# Patient Record
Sex: FEMALE | Race: BLACK OR AFRICAN AMERICAN | Employment: OTHER | URBAN - METROPOLITAN AREA
[De-identification: names, ages, dates, MRNs, and addresses within clinical notes are randomized per-mention and may not be internally consistent; named-entity substitution may affect disease eponyms.]

---

## 2017-01-03 ENCOUNTER — HOSPITAL ENCOUNTER (OUTPATIENT)
Dept: GENERAL RADIOLOGY | Age: 82
Discharge: HOME | End: 2017-01-03
Attending: PHYSICIAN ASSISTANT | Admitting: PHYSICIAN ASSISTANT

## 2017-01-03 DIAGNOSIS — K59.00 UNSPECIFIED CONSTIPATION: ICD-10-CM

## 2017-05-30 ENCOUNTER — HOSPITAL ENCOUNTER (OUTPATIENT)
Dept: CT IMAGING | Age: 82
Discharge: HOME | End: 2017-05-30
Attending: PHYSICIAN ASSISTANT | Admitting: PHYSICIAN ASSISTANT

## 2017-05-30 DIAGNOSIS — E16.2 HYPOGLYCEMIA, UNSPECIFIED: ICD-10-CM

## 2017-06-06 ENCOUNTER — HOSPITAL ENCOUNTER (OUTPATIENT)
Dept: LAB | Age: 82
Discharge: HOME | End: 2017-06-06
Attending: INTERNAL MEDICINE | Admitting: INTERNAL MEDICINE

## 2017-06-06 LAB
ALBUMIN SERPL-MCNC: 3.9 GM/DL (ref 3.4–5)
ALP BLD-CCNC: 71 IU/L (ref 40–128)
ALT SERPL-CCNC: 8 U/L (ref 10–40)
ANION GAP SERPL CALCULATED.3IONS-SCNC: 11 MMOL/L (ref 4–16)
AST SERPL-CCNC: 15 IU/L (ref 15–37)
BILIRUB SERPL-MCNC: 0.2 MG/DL (ref 0–1)
BUN BLDV-MCNC: 31 MG/DL (ref 6–23)
CALCIUM SERPL-MCNC: 10.3 MG/DL (ref 8.3–10.6)
CHLORIDE BLD-SCNC: 104 MMOL/L (ref 99–110)
CO2: 28 MMOL/L (ref 21–32)
CORTISOL, PLASMA: 18.41
CREAT SERPL-MCNC: 1.3 MG/DL (ref 0.6–1.1)
GFR AFRICAN AMERICAN: 47 ML/MIN/1.73M2
GFR NON-AFRICAN AMERICAN: 39 ML/MIN/1.73M2
GLUCOSE FASTING: 46 MG/DL (ref 70–99)
POTASSIUM SERPL-SCNC: 4.1 MMOL/L (ref 3.5–5.1)
SODIUM BLD-SCNC: 143 MMOL/L (ref 135–145)
TOTAL PROTEIN: 7.5 GM/DL (ref 6.4–8.2)

## 2017-06-07 LAB
INSULIN LIKE GROWTH FACTOR: 177
INSULIN: 13

## 2017-06-08 LAB
C-PEPTIDE: 5.2
GROWTH HORMONE: 5.19

## 2017-07-26 PROBLEM — I71.40 ABDOMINAL AORTIC ANEURYSM (AAA) WITHOUT RUPTURE: Status: ACTIVE | Noted: 2017-07-26

## 2017-07-26 PROBLEM — R91.1 LUNG NODULE: Status: ACTIVE | Noted: 2017-07-26

## 2017-07-26 PROBLEM — I71.20 THORACIC AORTIC ANEURYSM WITHOUT RUPTURE: Status: ACTIVE | Noted: 2017-07-26

## 2017-08-04 ENCOUNTER — HOSPITAL ENCOUNTER (OUTPATIENT)
Dept: GENERAL RADIOLOGY | Age: 82
Discharge: HOME | End: 2017-08-04
Attending: SURGERY | Admitting: SURGERY

## 2018-09-14 PROBLEM — G93.40 ACUTE ENCEPHALOPATHY: Status: ACTIVE | Noted: 2018-09-14

## 2018-09-15 PROBLEM — I63.10 CEREBROVASCULAR ACCIDENT (CVA) DUE TO EMBOLISM OF PRECEREBRAL ARTERY (HCC): Status: ACTIVE | Noted: 2018-09-15

## 2018-09-15 PROBLEM — R77.8 ELEVATED TROPONIN: Status: ACTIVE | Noted: 2018-09-15

## 2018-09-20 PROBLEM — G93.40 ACUTE ENCEPHALOPATHY: Status: RESOLVED | Noted: 2018-09-14 | Resolved: 2018-09-20

## 2018-09-20 PROBLEM — R77.8 ELEVATED TROPONIN: Status: RESOLVED | Noted: 2018-09-15 | Resolved: 2018-09-20

## 2018-10-12 ENCOUNTER — OFFICE VISIT (OUTPATIENT)
Dept: CARDIOLOGY CLINIC | Age: 83
End: 2018-10-12
Payer: MEDICARE

## 2018-10-12 VITALS
HEART RATE: 68 BPM | SYSTOLIC BLOOD PRESSURE: 132 MMHG | HEIGHT: 61 IN | BODY MASS INDEX: 24.81 KG/M2 | DIASTOLIC BLOOD PRESSURE: 80 MMHG | WEIGHT: 131.4 LBS

## 2018-10-12 DIAGNOSIS — I71.20 THORACIC AORTIC ANEURYSM WITHOUT RUPTURE: ICD-10-CM

## 2018-10-12 DIAGNOSIS — R41.89 COGNITIVE IMPAIRMENT: ICD-10-CM

## 2018-10-12 DIAGNOSIS — I25.10 ASCVD (ARTERIOSCLEROTIC CARDIOVASCULAR DISEASE): ICD-10-CM

## 2018-10-12 DIAGNOSIS — Q21.12 PFO (PATENT FORAMEN OVALE): ICD-10-CM

## 2018-10-12 DIAGNOSIS — I71.40 ABDOMINAL AORTIC ANEURYSM (AAA) WITHOUT RUPTURE: ICD-10-CM

## 2018-10-12 DIAGNOSIS — I63.10 CEREBROVASCULAR ACCIDENT (CVA) DUE TO EMBOLISM OF PRECEREBRAL ARTERY (HCC): Primary | ICD-10-CM

## 2018-10-12 PROCEDURE — 99214 OFFICE O/P EST MOD 30 MIN: CPT | Performed by: INTERNAL MEDICINE

## 2018-10-12 PROCEDURE — G8598 ASA/ANTIPLAT THER USED: HCPCS | Performed by: INTERNAL MEDICINE

## 2018-10-12 PROCEDURE — G8420 CALC BMI NORM PARAMETERS: HCPCS | Performed by: INTERNAL MEDICINE

## 2018-10-12 PROCEDURE — G8482 FLU IMMUNIZE ORDER/ADMIN: HCPCS | Performed by: INTERNAL MEDICINE

## 2018-10-12 PROCEDURE — 1101F PT FALLS ASSESS-DOCD LE1/YR: CPT | Performed by: INTERNAL MEDICINE

## 2018-10-12 PROCEDURE — 1111F DSCHRG MED/CURRENT MED MERGE: CPT | Performed by: INTERNAL MEDICINE

## 2018-10-12 PROCEDURE — G8427 DOCREV CUR MEDS BY ELIG CLIN: HCPCS | Performed by: INTERNAL MEDICINE

## 2018-10-12 PROCEDURE — 1090F PRES/ABSN URINE INCON ASSESS: CPT | Performed by: INTERNAL MEDICINE

## 2018-10-12 PROCEDURE — 1123F ACP DISCUSS/DSCN MKR DOCD: CPT | Performed by: INTERNAL MEDICINE

## 2018-10-12 PROCEDURE — 1036F TOBACCO NON-USER: CPT | Performed by: INTERNAL MEDICINE

## 2018-10-12 PROCEDURE — 4040F PNEUMOC VAC/ADMIN/RCVD: CPT | Performed by: INTERNAL MEDICINE

## 2018-10-12 NOTE — ASSESSMENT & PLAN NOTE
She is multivessel nonobstructive coronary artery disease. Continue aspirin and statin. She has no angina.   There is no indication of beta blockers

## 2018-10-12 NOTE — PROGRESS NOTES
9/19/18   Summary   Right to left PFO. positive bubble study   Lambl's excrescence seen on aortic valve cusps.   Thickened aortic valve leaflets.   Mild to moderate mitral regurgitation is present.   Small pericardial effusion with fibrinous matter; likely chronic.   Mildly dilated ascending aorta.   Ejection fraction is visually estimated at 55%. Cath 9/15/18     Conclusions      Procedure Summary   Indication: elevated troponin   Access : attempted radial but switched to femoral due to   tortuous subclavian   1. Left main is patent   2. OStial LAD has mild to moderate 30 to 40 % stenosis , in [place]   caudal view the stenosis appears worse but in other views it is   patent   3. RCA and CIRC has mild disease   4. Distal RCA has 50 % stenosis     Angiographic Findings    Diagnostic Findings     Cardiac Arteries and Lesion Findings   LMCA: Normal (no stenosis %) and No Angiographicalyl Significant  Disease. patent, very large calibre vessel    LAD: MILLER / CRANI view suggest proximal LAD lesion but it was probably  streaming effect as in other views it is widely patent, there is a heavily  calcified proximal LAD lesion    LCx: No Angiographicalyl Significant Disease and Mild Lumen  Irregularity. Widely patent large OM    RCA: distal RCA has a 40 to 50 % lesion       Carotid 9/14/18  The right internal carotid artery demonstrates 50-69% stenosis.       The left internal carotid artery demonstrates 50-69% stenosis.       Bilateral vertebral arteries are patent with flow in the normal direction. All labs, medications and tests reviewed by myself including data and history from outside source , patient and available family . Assessment & Plan:      1. Cerebrovascular accident (CVA) due to embolism of precerebral artery ([address]    2. Abdominal aortic aneurysm (AAA) without rupture ([address]    3. Thoracic aortic aneurysm without rupture ([address]    4. ASCVD (arteriosclerotic cardiovascular disease)    5.  PFO (patent foramen

## 2019-05-21 ENCOUNTER — OFFICE VISIT (OUTPATIENT)
Dept: CARDIOLOGY CLINIC | Age: 84
End: 2019-05-21
Payer: MEDICARE

## 2019-05-21 VITALS
WEIGHT: 118.8 LBS | BODY MASS INDEX: 22.43 KG/M2 | HEIGHT: 61 IN | DIASTOLIC BLOOD PRESSURE: 70 MMHG | SYSTOLIC BLOOD PRESSURE: 110 MMHG | HEART RATE: 58 BPM

## 2019-05-21 DIAGNOSIS — R63.4 WEIGHT LOSS: ICD-10-CM

## 2019-05-21 DIAGNOSIS — I63.10 CEREBROVASCULAR ACCIDENT (CVA) DUE TO EMBOLISM OF PRECEREBRAL ARTERY (HCC): ICD-10-CM

## 2019-05-21 DIAGNOSIS — I71.20 THORACIC AORTIC ANEURYSM WITHOUT RUPTURE: ICD-10-CM

## 2019-05-21 DIAGNOSIS — I25.10 ASCVD (ARTERIOSCLEROTIC CARDIOVASCULAR DISEASE): Primary | ICD-10-CM

## 2019-05-21 DIAGNOSIS — Q21.12 PFO (PATENT FORAMEN OVALE): ICD-10-CM

## 2019-05-21 DIAGNOSIS — R41.89 COGNITIVE IMPAIRMENT: ICD-10-CM

## 2019-05-21 DIAGNOSIS — I71.40 ABDOMINAL AORTIC ANEURYSM (AAA) WITHOUT RUPTURE: ICD-10-CM

## 2019-05-21 PROCEDURE — 1123F ACP DISCUSS/DSCN MKR DOCD: CPT | Performed by: INTERNAL MEDICINE

## 2019-05-21 PROCEDURE — 1036F TOBACCO NON-USER: CPT | Performed by: INTERNAL MEDICINE

## 2019-05-21 PROCEDURE — G8598 ASA/ANTIPLAT THER USED: HCPCS | Performed by: INTERNAL MEDICINE

## 2019-05-21 PROCEDURE — G8427 DOCREV CUR MEDS BY ELIG CLIN: HCPCS | Performed by: INTERNAL MEDICINE

## 2019-05-21 PROCEDURE — 4040F PNEUMOC VAC/ADMIN/RCVD: CPT | Performed by: INTERNAL MEDICINE

## 2019-05-21 PROCEDURE — G8420 CALC BMI NORM PARAMETERS: HCPCS | Performed by: INTERNAL MEDICINE

## 2019-05-21 PROCEDURE — 99213 OFFICE O/P EST LOW 20 MIN: CPT | Performed by: INTERNAL MEDICINE

## 2019-05-21 PROCEDURE — 1090F PRES/ABSN URINE INCON ASSESS: CPT | Performed by: INTERNAL MEDICINE

## 2019-05-21 NOTE — PROGRESS NOTES
CARDIOLOGY  NOTE    Chief Complaint: Follow-up for CVA and elevated troponin levels. Coronary artery disease    HPI:   Sandra Pedersen is a 80y.o. year old who has history as noted below. She has lost about 10 lbs since her last visit . She has no symptoms . She says she I not hungry   She was admitted in the hospital in September 2018 after she presented with altered mental status. She was noted to have elevated troponin levels of 0.5. Cardiac cath revealed moderate disease, but no significant stenosis. No intervention was done. Her MRIs revealed bilateral cerebral strokes. She had a ELZA which confirmed a PFO. Today she tells me that she continues to have memory deficits and difficulty managing simple chores, recalling events  She is having memmory issues      Current Outpatient Medications   Medication Sig Dispense Refill    apixaban (ELIQUIS) 2.5 MG TABS tablet Take 1 tablet by mouth 2 times daily 60 tablet 3    atorvastatin (LIPITOR) 20 MG tablet Take 1 tablet by mouth nightly 30 tablet 3    carvedilol (COREG) 6.25 MG tablet Take 1 tablet by mouth 2 times daily (with meals) 60 tablet 3    folic acid-pyridoxine-cyancobalamin (FOLTX) 1.13-25-2 MG TABS Take 1 tablet by mouth daily 30 tablet 3     No current facility-administered medications for this visit. Allergies:   Patient has no known allergies.     Patient History:  Past Medical History:   Diagnosis Date    Arthritis     Hyperlipidemia     Hypertension      Past Surgical History:   Procedure Laterality Date    HYSTERECTOMY       Family History   Problem Relation Age of Onset    Diabetes Mother     Heart Disease Mother     Coronary Art Dis Sister     Breast Cancer Sister     Heart Attack Brother     Heart Disease Brother     Heart Attack Maternal Grandfather     Heart Attack Brother      Social History     Tobacco Use    Smoking status: Former Smoker    Smokeless tobacco: Never Used   Substance Use Topics    Alcohol use: No        Review of Systems:   · Constitutional: No Fever or Weight Loss   · Eyes: No Decreased Vision  · ENT: No Headaches, Hearing Loss or Vertigo  · Cardiovascular: as per note above   · Respiratory: No cough or wheezing and as per note above. · Gastrointestinal: No abdominal pain, appetite loss, blood in stools, constipation, diarrhea or heartburn  · Genitourinary: No dysuria, trouble voiding, or hematuria  · Musculoskeletal:  None  · Integumentary: No rash or pruritis  · Neurological: cognitive Impairment  · Psychiatric: No anxiety or depression  · Endocrine: No malaise, fatigue or temperature intolerance  · Hematologic/Lymphatic: No bleeding problems, blood clots or swollen lymph nodes  · Allergic/Immunologic: No nasal congestion or hives    Objective:      Physical Exam:  /70   Pulse 58   Ht 5' 1\" (1.549 m)   Wt 118 lb 12.8 oz (53.9 kg)   BMI 22.45 kg/m²   Wt Readings from Last 3 Encounters:   05/21/19 118 lb 12.8 oz (53.9 kg)   10/12/18 131 lb 6.4 oz (59.6 kg)   09/20/18 136 lb (61.7 kg)     Body mass index is 22.45 kg/m². Vitals:    05/21/19 1451   BP: 110/70   Pulse: 58        General Appearance:  No distress, conversant  Constitutional:  Well developed, Well nourished, No acute distress, Non-toxic appearance. HENT:  Normocephalic, Atraumatic, Bilateral external ears normal, Oropharynx moist, No oral exudates, Nose normal. Neck- Normal range of motion, No tenderness, Supple, No stridor,no apical-carotid delay  Eyes:  PERRL, EOMI, Conjunctiva normal, No discharge. Respiratory:  Normal breath sounds, No respiratory distress, No wheezing, No chest tenderness. ,no use of accessory muscles, NO crackles  Cardiovascular: (PMI) apex non displaced,no lifts no thrills,S1 and S2 audible, No added heart sounds, No signs of ankle edema, or volume overload, No evidence of JVD, No crackles  GI:  Bowel sounds normal, Soft, No tenderness, No masses, No gross visceromegaly   :  No costovertebral angle tenderness   Musculoskeletal:  No edema, no tenderness, no deformities.  Back- no tenderness  Integument:  Well hydrated, no rash   Lymphatic:  No lymphadenopathy noted   Neurologic:  Alert & oriented x 3, CN 2-12 normal, normal motor function, normal sensory function, no focal deficits noted   Psychiatric:  Speech and behavior appropriate       Medical decision making and Data review:  DATA:  Lab Results   Component Value Date    TROPONINT 0.504 (HH) 09/15/2018     BNP:    Lab Results   Component Value Date    PROBNP 386.2 (H) 09/14/2018     PT/INR:  No results found for: PTINR  No results found for: LABA1C  Lab Results   Component Value Date    CHOL 180 09/15/2018    TRIG 91 09/15/2018    HDL 57 09/15/2018    LDLDIRECT 118 (H) 09/15/2018     Lab Results   Component Value Date    ALT 22 09/15/2018    AST 22 09/15/2018     TSH: No results found for: TSH  Lab Results   Component Value Date    AST 22 09/15/2018    ALT 22 09/15/2018    BILIDIR 0.2 06/23/2017    BILITOT 0.5 09/15/2018    ALKPHOS 61 09/15/2018     Lab Results   Component Value Date    PROBNP 386.2 (H) 09/14/2018     No results found for: LABA1C  Lab Results   Component Value Date    WBC 6.8 09/16/2018    HGB 9.0 (L) 09/16/2018    HCT 28.1 (L) 09/16/2018     09/16/2018     Echo 9/17/18    Summary   Left ventricular function is normal , EF is estimated at 55%.   Mild concentric left ventricular hypertrophy.   Grade I diastolic dysfunction suspected.   Right ventricular systolic pressure of 60QXVY, which is consistent with a   normal right ventricular systolic pressure   Trivial aortic regurgitation is noted.   Mild mitral regurgitation is present.   Right ventricular systolic pressure of 99NNZY, which is consistent with a   normal right ventricular systolic pressure.   Small pericardial effusion without tamponade physiology.   Borderline dilation of the ascending aorta.   Dilation of the abdominal aorta is noted.     ELZA 9/19/18   Summary   Right to left PFO. positive bubble study   Lambl's excrescence seen on aortic valve cusps.   Thickened aortic valve leaflets.   Mild to moderate mitral regurgitation is present.   Small pericardial effusion with fibrinous matter; likely chronic.   Mildly dilated ascending aorta.   Ejection fraction is visually estimated at 55%. Cath 9/15/18     Conclusions      Procedure Summary   Indication: elevated troponin   Access : attempted radial but switched to femoral due to   tortuous subclavian   1. Left main is patent   2. OStial LAD has mild to moderate 30 to 40 % stenosis , in [place]   caudal view the stenosis appears worse but in other views it is   patent   3. RCA and CIRC has mild disease   4. Distal RCA has 50 % stenosis     Angiographic Findings    Diagnostic Findings     Cardiac Arteries and Lesion Findings   LMCA: Normal (no stenosis %) and No Angiographicalyl Significant  Disease. patent, very large calibre vessel    LAD: MILLER / CRANI view suggest proximal LAD lesion but it was probably  streaming effect as in other views it is widely patent, there is a heavily  calcified proximal LAD lesion    LCx: No Angiographicalyl Significant Disease and Mild Lumen  Irregularity. Widely patent large OM    RCA: distal RCA has a 40 to 50 % lesion       Carotid 9/14/18  The right internal carotid artery demonstrates 50-69% stenosis.       The left internal carotid artery demonstrates 50-69% stenosis.       Bilateral vertebral arteries are patent with flow in the normal direction. All labs, medications and tests reviewed by myself including data and history from outside source , patient and available family . Assessment & Plan:      1. ASCVD (arteriosclerotic cardiovascular disease)    2. Cerebrovascular accident (CVA) due to embolism of precerebral artery ([address]    3. PFO (patent foramen ovale)    4. Thoracic aortic aneurysm without rupture ([address]    5. Cognitive impairment    6.  Abdominal aortic aneurysm (AAA) without rupture ([facility]    7. Weight loss         Cerebrovascular accident (CVA) due to embolism of precerebral artery (HCC)  There was no clear source of causative for embolic event, but she had bilateral cerebral CVA. At this stage, I think it is appropriate to continue anticoagulation. Carotid studies were normal she is on eliquis    ASCVD (arteriosclerotic cardiovascular disease)  She is multivessel nonobstructive coronary artery disease. Continue aspirin and statin. She has no angina. There is no indication of beta blockers    PFO (patent foramen ovale)  Patent gonzales  continue to monitor    Abdominal aortic aneurysm (AAA) without rupture (HCC)   Last  Ct was in May 2017   Supra and infrarenal abdominal aortic aneurysm measuring up to 4.7 cm. Repeat ultrasound       weight loss   Check tsh and labs , follow up with pcp     Dyslipidemia :  All available lab work was reviewed. Patient was advised to repeat lab work before next visit      Counseled extensively and medication compliance urged. We discussed that for the  prevention of ASCVD our  goal is aggressive risk modification. Patient is encouraged to exercise even a brisk walk for 30 minutes  at least 3 to 4 times a week   Various goals were discussed and questions answered. Continue current medications. Appropriate prescriptions are addressed and refills ordered. Questions answered and patient verbalizes understanding. Call for any problems, questions, or concerns. Continue all other medications of all above medical condition listed as is. No follow-ups on file. Please note this report has been partially produced using speech recognition software and may contain errors related to that system including errors in grammar, punctuation, and spelling, as well as words and phrases that may be inappropriate.  If there are any questions or concerns please feel free to contact the dictating provider for clarification.

## 2019-06-07 ENCOUNTER — HOSPITAL ENCOUNTER (OUTPATIENT)
Age: 84
Discharge: HOME | End: 2019-06-07
Payer: MEDICARE

## 2019-06-07 DIAGNOSIS — I71.20 THORACIC AORTIC ANEURYSM WITHOUT RUPTURE: ICD-10-CM

## 2019-06-07 DIAGNOSIS — I25.10 ASCVD (ARTERIOSCLEROTIC CARDIOVASCULAR DISEASE): ICD-10-CM

## 2019-06-07 DIAGNOSIS — R63.4 WEIGHT LOSS: ICD-10-CM

## 2019-06-07 DIAGNOSIS — R41.89 COGNITIVE IMPAIRMENT: ICD-10-CM

## 2019-06-07 DIAGNOSIS — I71.40 ABDOMINAL AORTIC ANEURYSM (AAA) WITHOUT RUPTURE: ICD-10-CM

## 2019-06-07 DIAGNOSIS — Q21.12 PFO (PATENT FORAMEN OVALE): ICD-10-CM

## 2019-06-07 DIAGNOSIS — I63.10 CEREBROVASCULAR ACCIDENT (CVA) DUE TO EMBOLISM OF PRECEREBRAL ARTERY (HCC): ICD-10-CM

## 2019-06-07 LAB
ALBUMIN SERPL-MCNC: 3.9 GM/DL (ref 3.4–5)
ALP BLD-CCNC: 67 IU/L (ref 40–129)
ALT SERPL-CCNC: 28 U/L (ref 10–40)
ANION GAP SERPL CALCULATED.3IONS-SCNC: 8 MMOL/L (ref 4–16)
AST SERPL-CCNC: 29 IU/L (ref 15–37)
BILIRUB SERPL-MCNC: 0.6 MG/DL (ref 0–1)
BUN BLDV-MCNC: 23 MG/DL (ref 6–23)
CALCIUM SERPL-MCNC: 10.3 MG/DL (ref 8.3–10.6)
CHLORIDE BLD-SCNC: 102 MMOL/L (ref 99–110)
CO2: 31 MMOL/L (ref 21–32)
CREAT SERPL-MCNC: 1.3 MG/DL (ref 0.6–1.1)
GFR AFRICAN AMERICAN: 47 ML/MIN/1.73M2
GFR NON-AFRICAN AMERICAN: 39 ML/MIN/1.73M2
GLUCOSE BLD-MCNC: 87 MG/DL (ref 70–99)
HCT VFR BLD CALC: 31.4 % (ref 37–47)
HEMOGLOBIN: 9.8 GM/DL (ref 12.5–16)
MCH RBC QN AUTO: 33.1 PG (ref 27–31)
MCHC RBC AUTO-ENTMCNC: 31.2 % (ref 32–36)
MCV RBC AUTO: 106.1 FL (ref 78–100)
PDW BLD-RTO: 13.2 % (ref 11.7–14.9)
PLATELET # BLD: 147 K/CU MM (ref 140–440)
PMV BLD AUTO: 10.3 FL (ref 7.5–11.1)
POTASSIUM SERPL-SCNC: 4.6 MMOL/L (ref 3.5–5.1)
RBC # BLD: 2.96 M/CU MM (ref 4.2–5.4)
SODIUM BLD-SCNC: 141 MMOL/L (ref 135–145)
TOTAL PROTEIN: 6.6 GM/DL (ref 6.4–8.2)
TSH HIGH SENSITIVITY: 1.7 UIU/ML (ref 0.27–4.2)
WBC # BLD: 5.9 K/CU MM (ref 4–10.5)

## 2019-06-07 PROCEDURE — 84443 ASSAY THYROID STIM HORMONE: CPT

## 2019-06-07 PROCEDURE — 85027 COMPLETE CBC AUTOMATED: CPT

## 2019-06-07 PROCEDURE — 80053 COMPREHEN METABOLIC PANEL: CPT

## 2019-06-07 PROCEDURE — 36415 COLL VENOUS BLD VENIPUNCTURE: CPT

## 2019-09-24 ENCOUNTER — OFFICE VISIT (OUTPATIENT)
Dept: CARDIOLOGY CLINIC | Age: 84
End: 2019-09-24
Payer: MEDICARE

## 2019-09-24 VITALS
HEART RATE: 65 BPM | WEIGHT: 114.8 LBS | DIASTOLIC BLOOD PRESSURE: 80 MMHG | BODY MASS INDEX: 20.34 KG/M2 | HEIGHT: 63 IN | SYSTOLIC BLOOD PRESSURE: 122 MMHG

## 2019-09-24 DIAGNOSIS — Q21.12 PFO (PATENT FORAMEN OVALE): ICD-10-CM

## 2019-09-24 DIAGNOSIS — R41.89 COGNITIVE IMPAIRMENT: ICD-10-CM

## 2019-09-24 DIAGNOSIS — I25.10 ASCVD (ARTERIOSCLEROTIC CARDIOVASCULAR DISEASE): ICD-10-CM

## 2019-09-24 DIAGNOSIS — I71.40 ABDOMINAL AORTIC ANEURYSM (AAA) WITHOUT RUPTURE: Primary | ICD-10-CM

## 2019-09-24 DIAGNOSIS — I63.10 CEREBROVASCULAR ACCIDENT (CVA) DUE TO EMBOLISM OF PRECEREBRAL ARTERY (HCC): ICD-10-CM

## 2019-09-24 DIAGNOSIS — I71.20 THORACIC AORTIC ANEURYSM WITHOUT RUPTURE: ICD-10-CM

## 2019-09-24 PROCEDURE — 1123F ACP DISCUSS/DSCN MKR DOCD: CPT | Performed by: INTERNAL MEDICINE

## 2019-09-24 PROCEDURE — G8420 CALC BMI NORM PARAMETERS: HCPCS | Performed by: INTERNAL MEDICINE

## 2019-09-24 PROCEDURE — 4040F PNEUMOC VAC/ADMIN/RCVD: CPT | Performed by: INTERNAL MEDICINE

## 2019-09-24 PROCEDURE — 1036F TOBACCO NON-USER: CPT | Performed by: INTERNAL MEDICINE

## 2019-09-24 PROCEDURE — 99214 OFFICE O/P EST MOD 30 MIN: CPT | Performed by: INTERNAL MEDICINE

## 2019-09-24 PROCEDURE — 1090F PRES/ABSN URINE INCON ASSESS: CPT | Performed by: INTERNAL MEDICINE

## 2019-09-24 PROCEDURE — G8427 DOCREV CUR MEDS BY ELIG CLIN: HCPCS | Performed by: INTERNAL MEDICINE

## 2019-09-24 PROCEDURE — G8598 ASA/ANTIPLAT THER USED: HCPCS | Performed by: INTERNAL MEDICINE

## 2019-10-28 ENCOUNTER — TELEPHONE (OUTPATIENT)
Dept: CARDIOLOGY CLINIC | Age: 84
End: 2019-10-28

## 2019-10-29 ENCOUNTER — TELEPHONE (OUTPATIENT)
Dept: CARDIOLOGY CLINIC | Age: 84
End: 2019-10-29

## 2019-11-08 ENCOUNTER — PROCEDURE VISIT (OUTPATIENT)
Dept: CARDIOLOGY CLINIC | Age: 84
End: 2019-11-08
Payer: MEDICARE

## 2019-11-08 DIAGNOSIS — I25.10 ASCVD (ARTERIOSCLEROTIC CARDIOVASCULAR DISEASE): ICD-10-CM

## 2019-11-08 DIAGNOSIS — I63.10 CEREBROVASCULAR ACCIDENT (CVA) DUE TO EMBOLISM OF PRECEREBRAL ARTERY (HCC): ICD-10-CM

## 2019-11-08 DIAGNOSIS — I71.20 THORACIC AORTIC ANEURYSM WITHOUT RUPTURE: ICD-10-CM

## 2019-11-08 DIAGNOSIS — I71.40 ABDOMINAL AORTIC ANEURYSM (AAA) WITHOUT RUPTURE: ICD-10-CM

## 2019-11-08 DIAGNOSIS — R41.89 COGNITIVE IMPAIRMENT: ICD-10-CM

## 2019-11-08 DIAGNOSIS — Q21.12 PFO (PATENT FORAMEN OVALE): ICD-10-CM

## 2019-11-08 PROCEDURE — 93979 VASCULAR STUDY: CPT | Performed by: INTERNAL MEDICINE

## 2019-11-12 ENCOUNTER — TELEPHONE (OUTPATIENT)
Dept: CARDIOLOGY CLINIC | Age: 84
End: 2019-11-12

## 2019-11-12 DIAGNOSIS — I71.40 ABDOMINAL AORTIC ANEURYSM (AAA) WITHOUT RUPTURE: Primary | ICD-10-CM

## 2019-11-19 ENCOUNTER — TELEPHONE (OUTPATIENT)
Dept: CARDIOLOGY CLINIC | Age: 84
End: 2019-11-19

## 2019-12-27 ENCOUNTER — HOSPITAL ENCOUNTER (OUTPATIENT)
Dept: INFUSION THERAPY | Age: 84
Setting detail: INFUSION SERIES
Discharge: HOME | End: 2019-12-27
Payer: MEDICARE

## 2019-12-27 ENCOUNTER — HOSPITAL ENCOUNTER (OUTPATIENT)
Dept: CT IMAGING | Age: 84
Discharge: HOME | End: 2019-12-27
Payer: MEDICARE

## 2019-12-27 VITALS
RESPIRATION RATE: 14 BRPM | DIASTOLIC BLOOD PRESSURE: 65 MMHG | OXYGEN SATURATION: 100 % | TEMPERATURE: 98.5 F | SYSTOLIC BLOOD PRESSURE: 115 MMHG | HEART RATE: 55 BPM

## 2019-12-27 DIAGNOSIS — I71.40 ABDOMINAL AORTIC ANEURYSM (AAA) WITHOUT RUPTURE: ICD-10-CM

## 2019-12-27 LAB
GFR AFRICAN AMERICAN: 46 ML/MIN/1.73M2
GFR NON-AFRICAN AMERICAN: 38 ML/MIN/1.73M2
POC CREATININE: 1.3 MG/DL (ref 0.6–1.1)

## 2019-12-27 PROCEDURE — 99211 OFF/OP EST MAY X REQ PHY/QHP: CPT

## 2019-12-27 PROCEDURE — 74174 CTA ABD&PLVS W/CONTRAST: CPT

## 2019-12-27 PROCEDURE — 2580000003 HC RX 258: Performed by: SURGERY

## 2019-12-27 PROCEDURE — 96361 HYDRATE IV INFUSION ADD-ON: CPT

## 2019-12-27 PROCEDURE — 6360000004 HC RX CONTRAST MEDICATION: Performed by: SURGERY

## 2019-12-27 PROCEDURE — 96360 HYDRATION IV INFUSION INIT: CPT

## 2019-12-27 RX ORDER — SODIUM CHLORIDE 0.9 % (FLUSH) 0.9 %
10 SYRINGE (ML) INJECTION PRN
Status: DISCONTINUED | OUTPATIENT
Start: 2019-12-27 | End: 2019-12-28 | Stop reason: HOSPADM

## 2019-12-27 RX ORDER — SODIUM CHLORIDE 9 MG/ML
INJECTION, SOLUTION INTRAVENOUS CONTINUOUS
Status: ACTIVE | OUTPATIENT
Start: 2019-12-27 | End: 2019-12-27

## 2019-12-27 RX ORDER — SODIUM CHLORIDE 9 MG/ML
INJECTION, SOLUTION INTRAVENOUS CONTINUOUS
Status: DISPENSED | OUTPATIENT
Start: 2019-12-27 | End: 2019-12-27

## 2019-12-27 RX ADMIN — SODIUM CHLORIDE: 9 INJECTION, SOLUTION INTRAVENOUS at 11:23

## 2019-12-27 RX ADMIN — IOPAMIDOL 75 ML: 755 INJECTION, SOLUTION INTRAVENOUS at 11:16

## 2019-12-27 RX ADMIN — SODIUM CHLORIDE: 9 INJECTION, SOLUTION INTRAVENOUS at 08:38

## 2019-12-27 RX ADMIN — Medication 10 ML: at 11:15

## 2020-03-13 ENCOUNTER — HOSPITAL ENCOUNTER (OUTPATIENT)
Dept: GENERAL RADIOLOGY | Age: 85
Discharge: HOME | End: 2020-03-13
Payer: MEDICARE

## 2020-03-13 PROCEDURE — 74250 X-RAY XM SM INT 1CNTRST STD: CPT

## 2020-06-30 ENCOUNTER — OFFICE VISIT (OUTPATIENT)
Dept: CARDIOLOGY CLINIC | Age: 85
End: 2020-06-30
Payer: MEDICARE

## 2020-06-30 VITALS
BODY MASS INDEX: 18.39 KG/M2 | WEIGHT: 103.8 LBS | DIASTOLIC BLOOD PRESSURE: 68 MMHG | SYSTOLIC BLOOD PRESSURE: 122 MMHG | HEIGHT: 63 IN | HEART RATE: 66 BPM

## 2020-06-30 PROCEDURE — 1123F ACP DISCUSS/DSCN MKR DOCD: CPT | Performed by: INTERNAL MEDICINE

## 2020-06-30 PROCEDURE — 4040F PNEUMOC VAC/ADMIN/RCVD: CPT | Performed by: INTERNAL MEDICINE

## 2020-06-30 PROCEDURE — G8427 DOCREV CUR MEDS BY ELIG CLIN: HCPCS | Performed by: INTERNAL MEDICINE

## 2020-06-30 PROCEDURE — 99214 OFFICE O/P EST MOD 30 MIN: CPT | Performed by: INTERNAL MEDICINE

## 2020-06-30 PROCEDURE — 1090F PRES/ABSN URINE INCON ASSESS: CPT | Performed by: INTERNAL MEDICINE

## 2020-06-30 PROCEDURE — G8419 CALC BMI OUT NRM PARAM NOF/U: HCPCS | Performed by: INTERNAL MEDICINE

## 2020-06-30 PROCEDURE — 1036F TOBACCO NON-USER: CPT | Performed by: INTERNAL MEDICINE

## 2020-06-30 RX ORDER — ZINC GLUCONATE 50 MG
50 TABLET ORAL DAILY
COMMUNITY

## 2020-06-30 RX ORDER — DONEPEZIL HYDROCHLORIDE 23 MG/1
23 TABLET, FILM COATED ORAL NIGHTLY
COMMUNITY

## 2020-06-30 NOTE — PROGRESS NOTES
systolic pressure   Trivial aortic regurgitation is noted.   Mild mitral regurgitation is present.   Right ventricular systolic pressure of 29TDYQ, which is consistent with a   normal right ventricular systolic pressure.   Small pericardial effusion without tamponade physiology.   Borderline dilation of the ascending aorta.   Dilation of the abdominal aorta is noted.     ELZA 9/19/18   Summary   Right to left PFO. positive bubble study   Lambl's excrescence seen on aortic valve cusps.   Thickened aortic valve leaflets.   Mild to moderate mitral regurgitation is present.   Small pericardial effusion with fibrinous matter; likely chronic.   Mildly dilated ascending aorta.   Ejection fraction is visually estimated at 55%. Cath 9/15/18     Conclusions      Procedure Summary   Indication: elevated troponin   Access : attempted radial but switched to femoral due to   tortuous subclavian   1. Left main is patent   2. OStial LAD has mild to moderate 30 to 40 % stenosis , in [place]   caudal view the stenosis appears worse but in other views it is   patent   3. RCA and CIRC has mild disease   4. Distal RCA has 50 % stenosis     Angiographic Findings    Diagnostic Findings     Cardiac Arteries and Lesion Findings   LMCA: Normal (no stenosis %) and No Angiographicalyl Significant  Disease. patent, very large calibre vessel    LAD: MILLER / CRANI view suggest proximal LAD lesion but it was probably  streaming effect as in other views it is widely patent, there is a heavily  calcified proximal LAD lesion    LCx: No Angiographicalyl Significant Disease and Mild Lumen  Irregularity. Widely patent large OM    RCA: distal RCA has a 40 to 50 % lesion       Carotid 9/14/18  The right internal carotid artery demonstrates 50-69% stenosis.       The left internal carotid artery demonstrates 50-69% stenosis.       Bilateral vertebral arteries are patent with flow in the normal direction.      Ultrasound abdomen 11/8/2019      There is aneurysmal dilatation of the proximal, mid and distal aorta as well    as the proximal left common iliac artery.    Proximal aorta is at 5.26 cm in greatest diameter. This is a change from CT    in 2017 measuring 4.7 cm.    Atherosclerotic plaque seen throughout abdominal aorta and common iliac    arteries with no evidence of stenosis. All labs, medications and tests reviewed by myself including data and history from outside source , patient and available family . Assessment & Plan:      1. ASCVD (arteriosclerotic cardiovascular disease)    2. Cerebrovascular accident (CVA) due to embolism of precerebral artery ([address]    3. Cognitive impairment    4. PFO (patent foramen ovale)    5. Thoracic aortic aneurysm without rupture ([address]    6. Weight loss    7. Abdominal aortic aneurysm (AAA) without rupture ([address]         Cerebrovascular accident (CVA) due to embolism of precerebral artery (HCC)  Continue eliquis ,although we never documented afib, she does have PFO  There was no clear source of causative for embolic event, but she had bilateral cerebral CVA. At this stage, I think it is appropriate to continue anticoagulation. Carotid studies were normal .    ASCVD (arteriosclerotic cardiovascular disease)  She is multivessel nonobstructive coronary artery disease. Continue aspirin and statin. She has no angina. There is no indication of beta blockers    PFO (patent foramen ovale)  Patent gonzales  continue to monitor    Abdominal aortic aneurysm (AAA) without rupture (HCC)  5.2 cm on ultrasound in Nov 2019 ,sees Dr Thong Ibarra     weight loss   Labs and tsh was normal  follow up with pcp, Mild CKD continues to decline     Dyslipidemia :  All available lab work was reviewed. Patient was advised to repeat lab work before next visit      Counseled extensively and medication compliance urged. We discussed that for the  prevention of ASCVD our  goal is aggressive risk modification. Patient is encouraged to exercise even a

## 2021-12-01 ENCOUNTER — APPOINTMENT (OUTPATIENT)
Dept: GENERAL RADIOLOGY | Age: 86
DRG: 689 | End: 2021-12-01
Payer: MEDICARE

## 2021-12-01 ENCOUNTER — APPOINTMENT (OUTPATIENT)
Dept: CT IMAGING | Age: 86
DRG: 689 | End: 2021-12-01
Payer: MEDICARE

## 2021-12-01 ENCOUNTER — HOSPITAL ENCOUNTER (INPATIENT)
Age: 86
LOS: 4 days | Discharge: HOME HEALTH | DRG: 689 | End: 2021-12-05
Attending: EMERGENCY MEDICINE
Payer: MEDICARE

## 2021-12-01 DIAGNOSIS — N30.01 ACUTE CYSTITIS WITH HEMATURIA: ICD-10-CM

## 2021-12-01 DIAGNOSIS — M25.551 RIGHT HIP PAIN: ICD-10-CM

## 2021-12-01 DIAGNOSIS — D64.9 ANEMIA, UNSPECIFIED TYPE: ICD-10-CM

## 2021-12-01 DIAGNOSIS — R93.0 ABNORMAL CT OF THE HEAD: ICD-10-CM

## 2021-12-01 DIAGNOSIS — R41.82 ALTERED MENTAL STATUS, UNSPECIFIED ALTERED MENTAL STATUS TYPE: Primary | ICD-10-CM

## 2021-12-01 LAB
ALBUMIN SERPL-MCNC: 3.4 GM/DL (ref 3.4–5)
ALP BLD-CCNC: 84 IU/L (ref 40–129)
ALT SERPL-CCNC: 11 U/L (ref 10–40)
ANION GAP SERPL CALCULATED.3IONS-SCNC: 11 MMOL/L (ref 4–16)
APTT: 33 SECONDS (ref 25.1–37.1)
AST SERPL-CCNC: 32 IU/L (ref 15–37)
BACTERIA: ABNORMAL /HPF
BASE EXCESS MIXED: 1.8 (ref 0–2)
BASE EXCESS: ABNORMAL (ref 0–2)
BASOPHILS ABSOLUTE: 0.1 K/CU MM
BASOPHILS RELATIVE PERCENT: 0.7 % (ref 0–1)
BILIRUB SERPL-MCNC: 0.5 MG/DL (ref 0–1)
BILIRUBIN URINE: NEGATIVE MG/DL
BLOOD, URINE: ABNORMAL
BUN BLDV-MCNC: 20 MG/DL (ref 6–23)
CALCIUM SERPL-MCNC: 10.6 MG/DL (ref 8.3–10.6)
CAST TYPE: ABNORMAL /HPF
CHLORIDE BLD-SCNC: 104 MMOL/L (ref 99–110)
CLARITY: CLEAR
CO2: 24 MMOL/L (ref 21–32)
COLOR: ABNORMAL
COMMENT UA: ABNORMAL
CREAT SERPL-MCNC: 1.1 MG/DL (ref 0.6–1.1)
CRYSTAL TYPE: ABNORMAL /HPF
DIFFERENTIAL TYPE: ABNORMAL
EOSINOPHILS ABSOLUTE: 0.4 K/CU MM
EOSINOPHILS RELATIVE PERCENT: 5.2 % (ref 0–3)
EPITHELIAL CELLS, UA: 10 /HPF
GFR AFRICAN AMERICAN: 57 ML/MIN/1.73M2
GFR NON-AFRICAN AMERICAN: 47 ML/MIN/1.73M2
GLUCOSE BLD-MCNC: 97 MG/DL (ref 70–99)
GLUCOSE, URINE: NEGATIVE MG/DL
HCO3 VENOUS: 27.6 MMOL/L (ref 19–25)
HCT VFR BLD CALC: 30.3 % (ref 37–47)
HEMOGLOBIN: 10 GM/DL (ref 12.5–16)
IMMATURE NEUTROPHIL %: 0.3 % (ref 0–0.43)
INR BLD: 1.33 INDEX
KETONES, URINE: NEGATIVE MG/DL
LEUKOCYTE ESTERASE, URINE: ABNORMAL
LYMPHOCYTES ABSOLUTE: 1.5 K/CU MM
LYMPHOCYTES RELATIVE PERCENT: 21.1 % (ref 24–44)
MCH RBC QN AUTO: 33.8 PG (ref 27–31)
MCHC RBC AUTO-ENTMCNC: 33 % (ref 32–36)
MCV RBC AUTO: 102.4 FL (ref 78–100)
MONOCYTES ABSOLUTE: 0.7 K/CU MM
MONOCYTES RELATIVE PERCENT: 9.6 % (ref 0–4)
NITRITE URINE, QUANTITATIVE: NEGATIVE
O2 SAT, VEN: 46.7 % (ref 50–70)
PCO2, VEN: 47.5 MMHG (ref 38–52)
PDW BLD-RTO: 13.3 % (ref 11.7–14.9)
PH VENOUS: 7.37 (ref 7.32–7.42)
PH, URINE: 6.5 (ref 5–8)
PLATELET # BLD: 155 K/CU MM (ref 140–440)
PMV BLD AUTO: 10.3 FL (ref 7.5–11.1)
PO2, VEN: 26.6 MMHG (ref 28–48)
POTASSIUM SERPL-SCNC: 4.5 MMOL/L (ref 3.5–5.1)
PROTEIN UA: NEGATIVE MG/DL
PROTHROMBIN TIME: 16.3 SECONDS (ref 11.7–14.5)
RBC # BLD: 2.96 M/CU MM (ref 4.2–5.4)
RBC URINE: 10 /HPF (ref 0–6)
SEGMENTED NEUTROPHILS ABSOLUTE COUNT: 4.5 K/CU MM
SEGMENTED NEUTROPHILS RELATIVE PERCENT: 63.1 % (ref 36–66)
SODIUM BLD-SCNC: 139 MMOL/L (ref 135–145)
SPECIFIC GRAVITY UA: 1 (ref 1–1.03)
TOTAL IMMATURE NEUTOROPHIL: 0.02 K/CU MM
TOTAL PROTEIN: 7.3 GM/DL (ref 6.4–8.2)
UROBILINOGEN, URINE: 1 MG/DL (ref 0.2–1)
WBC # BLD: 7.1 K/CU MM (ref 4–10.5)
WBC UA: 25 /HPF (ref 0–5)

## 2021-12-01 PROCEDURE — 85730 THROMBOPLASTIN TIME PARTIAL: CPT

## 2021-12-01 PROCEDURE — 93005 ELECTROCARDIOGRAM TRACING: CPT | Performed by: EMERGENCY MEDICINE

## 2021-12-01 PROCEDURE — 81001 URINALYSIS AUTO W/SCOPE: CPT

## 2021-12-01 PROCEDURE — 73502 X-RAY EXAM HIP UNI 2-3 VIEWS: CPT

## 2021-12-01 PROCEDURE — 96365 THER/PROPH/DIAG IV INF INIT: CPT

## 2021-12-01 PROCEDURE — 99284 EMERGENCY DEPT VISIT MOD MDM: CPT

## 2021-12-01 PROCEDURE — 70450 CT HEAD/BRAIN W/O DYE: CPT

## 2021-12-01 PROCEDURE — 87086 URINE CULTURE/COLONY COUNT: CPT

## 2021-12-01 PROCEDURE — 6360000002 HC RX W HCPCS: Performed by: EMERGENCY MEDICINE

## 2021-12-01 PROCEDURE — 71045 X-RAY EXAM CHEST 1 VIEW: CPT

## 2021-12-01 PROCEDURE — 2580000003 HC RX 258: Performed by: EMERGENCY MEDICINE

## 2021-12-01 PROCEDURE — 85610 PROTHROMBIN TIME: CPT

## 2021-12-01 PROCEDURE — 82805 BLOOD GASES W/O2 SATURATION: CPT

## 2021-12-01 PROCEDURE — 85025 COMPLETE CBC W/AUTO DIFF WBC: CPT

## 2021-12-01 PROCEDURE — 2140000000 HC CCU INTERMEDIATE R&B

## 2021-12-01 PROCEDURE — 80053 COMPREHEN METABOLIC PANEL: CPT

## 2021-12-01 RX ADMIN — CEFTRIAXONE SODIUM 1000 MG: 1 INJECTION, POWDER, FOR SOLUTION INTRAMUSCULAR; INTRAVENOUS at 17:25

## 2021-12-01 NOTE — ED PROVIDER NOTES
EMERGENCY DEPARTMENT ENCOUNTER      CHIEF COMPLAINT:   Altered Mental Status    HPI: Trang Interiano is a 80 y.o. female who presents to the emergency department, with her daughter, for evaluation of confusion, possible UTI and right hip pain. The patient is confused and so most information is obtained from the daughter. She states that the patient has been confused intermittently over the past 1.5 weeks. She has had similar behavior in the past when she has a urinary tract infection. The patient has also been complaining of right hip pain with no known injury. Symptoms have been intermittent. There are no exacerbating or alleviating factors. Denies any knowledge of fevers, cough, vomiting or any other complaints. REVIEW OF SYSTEMS:   \"Remaining review of systems unable to obtain due to patient with altered mental status. I have reviewed the nursing triage documentation and agree unless otherwise noted below. \"      PAST MEDICAL HISTORY:   Past Medical History:   Diagnosis Date    AAA (abdominal aortic aneurysm) ([facility] [address] 11/08/2019    5.26 refered to CT.  Arthritis     Hyperlipidemia     Hypertension        CURRENT MEDICATIONS:   Home medications reviewed.     SURGICAL HISTORY:   Past Surgical History:   Procedure Laterality Date    HYSTERECTOMY         FAMILY HISTORY:   Family History   Problem Relation Age of Onset    Diabetes Mother     Heart Disease Mother     Coronary Art Dis Sister     Breast Cancer Sister     Heart Attack Brother     Heart Disease Brother     Heart Attack Maternal Grandfather     Heart Attack Brother        SOCIAL HISTORY:   Social History     Socioeconomic History    Marital status:      Spouse name: Not on file    Number of children: Not on file    Years of education: Not on file    Highest education level: Not on file   Occupational History    Not on file   Tobacco Use    Smoking status: Former Smoker    Smokeless tobacco: Never Used   Substance and Sexual Activity    Alcohol use: No    Drug use: No    Sexual activity: Never     Partners: Male   Other Topics Concern    Not on file   Social History Narrative    Not on file     Social Determinants of Health     Financial Resource Strain:     Difficulty of Paying Living Expenses: Not on file   Food Insecurity:     Worried About Running Out of Food in the Last Year: Not on file    [place] of Food in the Last Year: Not on file   Transportation Needs:     Lack of Transportation (Medical): Not on file    Lack of Transportation (Non-Medical): Not on file   Physical Activity:     Days of Exercise per Week: Not on file    Minutes of Exercise per Session: Not on file   Stress:     Feeling of Stress : Not on file   Social Connections:     Frequency of Communication with Friends and Family: Not on file    Frequency of Social Gatherings with Friends and Family: Not on file    Attends Worship Services: Not on file    Active Member of [address] [place] [address] [email or web address] or Organizations: Not on file    Attends Club or Organization Meetings: Not on file    Marital Status: Not on file   Intimate Partner Violence:     Fear of Current or Ex-Partner: Not on file    Emotionally Abused: Not on file    Physically Abused: Not on file    Sexually Abused: Not on file   Housing Stability:     Unable to Pay for Housing in the Last Year: Not on file    Number of [place] in the Last Year: Not on file    Unstable Housing in the Last Year: Not on file       ALLERGIES: Patient has no known allergies. PHYSICAL EXAM:  VITAL SIGNS:   ED Triage Vitals   Enc Vitals Group      BP 12/01/21 1414 (!) 180/98      Pulse 12/01/21 1412 68      Resp 12/01/21 1412 16      Temp 12/01/21 1412 97.6 °F (36.4 °C)      Temp src --       SpO2 12/01/21 1412 96 %      Weight 12/01/21 1359 116 lb (52.6 kg)      Height 12/01/21 1359 5' (1.524 m)      Head Circumference --       Peak Flow --       Pain Score --       Pain Loc --       Pain Edu? --       Excl.  in [address]? -- Constitutional: Awake, alert, pleasantly confused  HENT: Normocephalic, Atraumatic  Eyes:  PERRL, Conjunctiva normal, No discharge. Neck: Normal range of motion, No tenderness, Supple, No stridor, No meningeal signs,No lymphadenopathy   Cardiovascular:  Normal heart rate, Normal rhythm  Pulmonary/Chest:  Normal breath sounds, No respiratory distress, No wheezing  Abdomen: Bowel sounds normal, Soft, No tenderness, No masses, No pulsatile masses  Extremities:  Normal range of motion, Intact distal pulses, No edema, right lateral hip tenderness to palpation with no bruising or deformity  Neurologic:  Confused, pupils equal round and reactive to light, speech clear, Normal motor function, Sensation intact to light touch throughout, No focal deficits  Skin:  Warm, Dry, No erythema, No rash      EKG Interpretation  Interpreted by me  Compared to 9/14/2018  Rhythm: normal sinus  Rate: normal 62  Axis: normal  Ectopy: none  Conduction: first-degree AV block  ST Segments: no acute change  T Waves: no acute change  Clinical Impression: normal sinus rhythm, first-degree AV block which is chronic, no acute change    Cardiac Monitor Strip Interpretation  Interpreted by me  Monitor strip interpreted for greater than 10 seconds  Rhythm: normal sinus  Rate: normal  Ectopy: none  ST Segments: normal      Radiology / Procedures:  CT HEAD WO CONTRAST (Final result)  Result time 12/01/21 15:08:56  Final result by Keagan Alarcon MD (12/01/21 15:08:56)                Impression: 1. Small punctate hyperdensity within the right frontal lobe adjacent to the   frontal horn of the right lateral ventricle measuring 3 mm.  This could   represent a small cavernoma, venous angioma versus a punctate hemorrhagic   contusion.  An MRI brain is suggested for further evaluation.              Narrative:    EXAMINATION:   CT OF THE HEAD WITHOUT CONTRAST  12/1/2021 2:37 pm     TECHNIQUE:   CT of the head was performed without the administration of intravenous   contrast. Dose modulation, iterative reconstruction, and/or weight based   adjustment of the mA/kV was utilized to reduce the radiation dose to as low   as reasonably achievable. COMPARISON:   09/14/2018. HISTORY:   ORDERING SYSTEM PROVIDED HISTORY: Confusion   TECHNOLOGIST PROVIDED HISTORY:   Has a \"code stroke\" or \"stroke alert\" been called? ->No   Reason for exam:->Confusion   Decision Support Exception - unselect if not a suspected or confirmed   emergency medical condition->Emergency Medical Condition (MA)   Reason for Exam: Confusion   Acuity: Acute   Type of Exam: Initial   Relevant Medical/Surgical History: hx CVA     FINDINGS:   BRAIN/VENTRICLES: There is a punctate hyperdensity seen adjacent to the   frontal bone of the right lateral ventricle measuring 3 mm.  No other areas   of hemorrhage are seen. Robertha Munir is no mass effect or midline shift. No   abnormal extra-axial fluid collection.  The gray-white differentiation is   maintained without evidence of an acute infarct. There is prominence of the   ventricles and sulci due to global parenchymal volume loss. Robertha Munir are   nonspecific areas of hypoattenuation within the periventricular and   subcortical white matter, which likely represent chronic microvascular   ischemic change. ORBITS: The visualized portion of the orbits demonstrate no acute abnormality. SINUSES: The visualized paranasal sinuses and mastoid air cells demonstrate   no acute abnormality. SOFT TISSUES/SKULL: No acute abnormality of the visualized skull or soft   tissues.                         XR HIP 2-3 VW W PELVIS RIGHT (Final result)  Result time 12/01/21 15:14:03  Final result by Roger Rees MD (12/01/21 15:14:03)                Impression:    No acute osseous abnormality.  Given the degree of osteopenia, nondisplaced   fractures may be radiographically occult.  If pain or concern for fracture   persists, consider MR imaging.              Narrative: EXAMINATION:   ONE XRAY VIEW OF THE PELVIS AND TWO XRAY VIEWS RIGHT HIP     12/1/2021 2:37 pm     COMPARISON:   07/19/2011     HISTORY:   ORDERING SYSTEM PROVIDED HISTORY: Right hip pain   TECHNOLOGIST PROVIDED HISTORY:   Reason for exam:->Right hip pain   Reason for Exam: Right hip pain   Acuity: Acute   Type of Exam: Initial   Relevant Medical/Surgical History: NKI     FINDINGS:   Diffuse osteopenia.  Vascular calcifications.  Pelvic ring is intact.  Mild   degenerative changes of the hip joints and sacroiliac joints.  Moderate   amount stool in the visualized colon.  Soft tissues otherwise unremarkable. No acute fracture or dislocation.                       XR CHEST PORTABLE (Final result)  Result time 12/01/21 15:15:43  Final result by Mariela Tavares MD (12/01/21 15:15:43)                Impression:    Stable cardiomegaly with evidence of pulmonary venous hypertension.  No acute   pneumonia or overt congestive heart failure. Narrative:    EXAMINATION:   ONE XRAY VIEW OF THE CHEST     12/1/2021 2:37 pm     COMPARISON:   Chest radiograph 09/14/2018. HISTORY:   ORDERING SYSTEM PROVIDED HISTORY: Confusion   TECHNOLOGIST PROVIDED HISTORY:   Reason for exam:->Confusion   Reason for Exam: Confusion   Acuity: Acute   Type of Exam: Initial   Relevant Medical/Surgical History: hx htn     FINDINGS:   Single view provided.  Mild rotation.  Stable ectatic atherosclerotic   thoracic aorta and enlarged cardiac silhouette.  Normal lung volumes with   central pulmonary vascular prominence.  No diffuse interstitial edema or   acute consolidation.  No pneumothorax or large effusion.  No free   subdiaphragmatic air.                  Labs Reviewed   CBC WITH AUTO DIFFERENTIAL - Abnormal; Notable for the following components:       Result Value    RBC 2.96 (*)     Hemoglobin 10.0 (*)     Hematocrit 30.3 (*)     .4 (*)     MCH 33.8 (*)     Lymphocytes % 21.1 (*)     Monocytes % 9.6 (*)     Eosinophils % 5.2 (*)     All other components within normal limits   COMPREHENSIVE METABOLIC PANEL - Abnormal; Notable for the following components:    GFR Non- 47 (*)     GFR  57 (*)     All other components within normal limits   BLOOD GAS, VENOUS - Abnormal; Notable for the following components:    pO2, Vijay 26.6 (*)     HCO3, Venous 27.6 (*)     O2 Sat, Vijay 46.7 (*)     All other components within normal limits   URINALYSIS WITH MICROSCOPIC - Abnormal; Notable for the following components:    Color, UA PALE YELLOW (*)     Blood, Urine LARGE (*)     Leukocyte Esterase, Urine SMALL (*)     RBC, UA 10 (*)     WBC, UA 25 (*)     Bacteria, UA MODERATE (*)     All other components within normal limits   PROTIME/INR & PTT - Abnormal; Notable for the following components:    Protime 16.3 (*)     All other components within normal limits   CULTURE, URINE       ED COURSE & MEDICAL DECISION MAKING:  Pertinent Labs & Imaging studies reviewed. (See chart for details)  On exam, the patient is afebrile and nontoxic appearing. She is hypertensive with a known history of hypertension and is asymptomatic. She is otherwise hemodynamically stable. She is pleasantly confused with no focal neurological deficits. EKG shows a normal sinus rhythm with no ST elevation or depression. Labs are obtained and are significant for anemia and a urinary tract infection. CT head shows a small punctate hyperdensity within the right frontal lobe adjacent to the frontal horn. It measures 3 mm. This could represent a small cavernoma, venous angioma versus possible punctate hemorrhage contusion. The patient takes Eliquis for history of embolic CVA and PFO. Chest x-ray shows stable cardiomegaly with evidence of pulmonary venous hypertension but no acute pneumonia or overt congestive heart failure. Hip x-ray is negative for acute osseous abnormality. I suspect that the patient has delirium secondary to UTI.   She also has an abnormal CT scan of the head for which she needs an MRI. I have a low suspicion for ischemic CVA, intoxication, psychosis, meningitis, brain mass, or sepsis. I recommended admission to the hospital and the patient and her daughter were agreeable. I discussed the case with the neurosurgeon on-call at [facility], Dr. Julian Ventura, who will see the patient in consult. He did not recommend reversing her anticoagulation and states there are no specific blood pressure parameters to maintain. I spoke with the hospitalist who accepted the patient in transfer. Clinical Impression:  1. Altered mental status, unspecified altered mental status type    2. Acute cystitis with hematuria    3. Anemia, unspecified type    4. Abnormal CT of the head    5. Right hip pain          Comment: Please note this report has been produced using speech recognition software and may contain errors related to that system including errors in grammar, punctuation, and spelling, as well as words and phrases that may be inappropriate. If there are any questions or concerns please feel free to contact the dictating provider for clarification.         Aaron Bahena MD  12/01/21 2046

## 2021-12-01 NOTE — Clinical Note
Patient Class: Inpatient [101]  REQUIRED: Diagnosis: AMS (altered mental status) [1428110]  Estimated Length of Stay: Estimated stay of more than 2 midnights  Admitting Provider: Jaleesa Kruse [[id number]]

## 2021-12-02 ENCOUNTER — APPOINTMENT (OUTPATIENT)
Dept: CT IMAGING | Age: 86
DRG: 689 | End: 2021-12-02
Payer: MEDICARE

## 2021-12-02 PROBLEM — G93.40 ACUTE ENCEPHALOPATHY: Status: ACTIVE | Noted: 2021-12-02

## 2021-12-02 LAB
ANION GAP SERPL CALCULATED.3IONS-SCNC: 14 MMOL/L (ref 4–16)
BASOPHILS ABSOLUTE: 0.1 K/CU MM
BASOPHILS RELATIVE PERCENT: 0.7 % (ref 0–1)
BUN BLDV-MCNC: 18 MG/DL (ref 6–23)
CALCIUM SERPL-MCNC: 10.4 MG/DL (ref 8.3–10.6)
CHLORIDE BLD-SCNC: 107 MMOL/L (ref 99–110)
CO2: 23 MMOL/L (ref 21–32)
CREAT SERPL-MCNC: 1 MG/DL (ref 0.6–1.1)
CULTURE: NORMAL
DIFFERENTIAL TYPE: ABNORMAL
EKG ATRIAL RATE: 62 BPM
EKG DIAGNOSIS: NORMAL
EKG P AXIS: 29 DEGREES
EKG P-R INTERVAL: 316 MS
EKG Q-T INTERVAL: 362 MS
EKG QRS DURATION: 88 MS
EKG QTC CALCULATION (BAZETT): 367 MS
EKG R AXIS: -2 DEGREES
EKG T AXIS: -25 DEGREES
EKG VENTRICULAR RATE: 62 BPM
EOSINOPHILS ABSOLUTE: 0.3 K/CU MM
EOSINOPHILS RELATIVE PERCENT: 3.2 % (ref 0–3)
GFR AFRICAN AMERICAN: >60 ML/MIN/1.73M2
GFR NON-AFRICAN AMERICAN: 52 ML/MIN/1.73M2
GLUCOSE BLD-MCNC: 74 MG/DL (ref 70–99)
HCT VFR BLD CALC: 32.8 % (ref 37–47)
HEMOGLOBIN: 10.7 GM/DL (ref 12.5–16)
HIGH SENSITIVE C-REACTIVE PROTEIN: 9.5 MG/L
IMMATURE NEUTROPHIL %: 0.2 % (ref 0–0.43)
LYMPHOCYTES ABSOLUTE: 1.9 K/CU MM
LYMPHOCYTES RELATIVE PERCENT: 22.4 % (ref 24–44)
Lab: NORMAL
MAGNESIUM: 2.1 MG/DL (ref 1.8–2.4)
MCH RBC QN AUTO: 34 PG (ref 27–31)
MCHC RBC AUTO-ENTMCNC: 32.6 % (ref 32–36)
MCV RBC AUTO: 104.1 FL (ref 78–100)
MONOCYTES ABSOLUTE: 0.7 K/CU MM
MONOCYTES RELATIVE PERCENT: 7.8 % (ref 0–4)
NUCLEATED RBC %: 0 %
PDW BLD-RTO: 13.1 % (ref 11.7–14.9)
PHOSPHORUS: 2.9 MG/DL (ref 2.5–4.9)
PLATELET # BLD: 154 K/CU MM (ref 140–440)
PMV BLD AUTO: 10.4 FL (ref 7.5–11.1)
POTASSIUM SERPL-SCNC: 4 MMOL/L (ref 3.5–5.1)
PROCALCITONIN: 0.11
RBC # BLD: 3.15 M/CU MM (ref 4.2–5.4)
SEGMENTED NEUTROPHILS ABSOLUTE COUNT: 5.5 K/CU MM
SEGMENTED NEUTROPHILS RELATIVE PERCENT: 65.7 % (ref 36–66)
SODIUM BLD-SCNC: 144 MMOL/L (ref 135–145)
SPECIMEN: NORMAL
TOTAL IMMATURE NEUTOROPHIL: 0.02 K/CU MM
TOTAL NUCLEATED RBC: 0 K/CU MM
WBC # BLD: 8.3 K/CU MM (ref 4–10.5)

## 2021-12-02 PROCEDURE — 6370000000 HC RX 637 (ALT 250 FOR IP): Performed by: INTERNAL MEDICINE

## 2021-12-02 PROCEDURE — 2500000003 HC RX 250 WO HCPCS: Performed by: INTERNAL MEDICINE

## 2021-12-02 PROCEDURE — 6360000002 HC RX W HCPCS: Performed by: INTERNAL MEDICINE

## 2021-12-02 PROCEDURE — 93010 ELECTROCARDIOGRAM REPORT: CPT | Performed by: INTERNAL MEDICINE

## 2021-12-02 PROCEDURE — 84100 ASSAY OF PHOSPHORUS: CPT

## 2021-12-02 PROCEDURE — 84145 PROCALCITONIN (PCT): CPT

## 2021-12-02 PROCEDURE — 85025 COMPLETE CBC W/AUTO DIFF WBC: CPT

## 2021-12-02 PROCEDURE — 86141 C-REACTIVE PROTEIN HS: CPT

## 2021-12-02 PROCEDURE — 80048 BASIC METABOLIC PNL TOTAL CA: CPT

## 2021-12-02 PROCEDURE — 70450 CT HEAD/BRAIN W/O DYE: CPT

## 2021-12-02 PROCEDURE — 2580000003 HC RX 258: Performed by: INTERNAL MEDICINE

## 2021-12-02 PROCEDURE — 36415 COLL VENOUS BLD VENIPUNCTURE: CPT

## 2021-12-02 PROCEDURE — 2140000000 HC CCU INTERMEDIATE R&B

## 2021-12-02 PROCEDURE — 83735 ASSAY OF MAGNESIUM: CPT

## 2021-12-02 RX ORDER — SODIUM CHLORIDE 0.9 % (FLUSH) 0.9 %
5-40 SYRINGE (ML) INJECTION PRN
Status: DISCONTINUED | OUTPATIENT
Start: 2021-12-02 | End: 2021-12-05 | Stop reason: HOSPADM

## 2021-12-02 RX ORDER — POLYETHYLENE GLYCOL 3350 17 G/17G
17 POWDER, FOR SOLUTION ORAL DAILY PRN
Status: DISCONTINUED | OUTPATIENT
Start: 2021-12-02 | End: 2021-12-05 | Stop reason: HOSPADM

## 2021-12-02 RX ORDER — LABETALOL HYDROCHLORIDE 5 MG/ML
10 INJECTION, SOLUTION INTRAVENOUS EVERY 6 HOURS PRN
Status: DISCONTINUED | OUTPATIENT
Start: 2021-12-02 | End: 2021-12-05 | Stop reason: HOSPADM

## 2021-12-02 RX ORDER — LABETALOL HYDROCHLORIDE 5 MG/ML
10 INJECTION, SOLUTION INTRAVENOUS EVERY 6 HOURS PRN
Status: DISCONTINUED | OUTPATIENT
Start: 2021-12-02 | End: 2021-12-02 | Stop reason: SDUPTHER

## 2021-12-02 RX ORDER — CARVEDILOL 6.25 MG/1
6.25 TABLET ORAL 2 TIMES DAILY WITH MEALS
Status: DISCONTINUED | OUTPATIENT
Start: 2021-12-02 | End: 2021-12-03

## 2021-12-02 RX ORDER — ACETAMINOPHEN 325 MG/1
650 TABLET ORAL EVERY 6 HOURS PRN
Status: DISCONTINUED | OUTPATIENT
Start: 2021-12-02 | End: 2021-12-05 | Stop reason: HOSPADM

## 2021-12-02 RX ORDER — SODIUM CHLORIDE 9 MG/ML
25 INJECTION, SOLUTION INTRAVENOUS PRN
Status: DISCONTINUED | OUTPATIENT
Start: 2021-12-02 | End: 2021-12-05 | Stop reason: HOSPADM

## 2021-12-02 RX ORDER — ONDANSETRON 4 MG/1
4 TABLET, ORALLY DISINTEGRATING ORAL EVERY 8 HOURS PRN
Status: DISCONTINUED | OUTPATIENT
Start: 2021-12-02 | End: 2021-12-05 | Stop reason: HOSPADM

## 2021-12-02 RX ORDER — B12/LEVOMEFOLATE CALCIUM/B-6 2-1.13-25
1 TABLET ORAL DAILY
Status: DISCONTINUED | OUTPATIENT
Start: 2021-12-02 | End: 2021-12-05 | Stop reason: HOSPADM

## 2021-12-02 RX ORDER — ACETAMINOPHEN 650 MG/1
650 SUPPOSITORY RECTAL EVERY 6 HOURS PRN
Status: DISCONTINUED | OUTPATIENT
Start: 2021-12-02 | End: 2021-12-05 | Stop reason: HOSPADM

## 2021-12-02 RX ORDER — SODIUM CHLORIDE 0.9 % (FLUSH) 0.9 %
5-40 SYRINGE (ML) INJECTION EVERY 12 HOURS SCHEDULED
Status: DISCONTINUED | OUTPATIENT
Start: 2021-12-02 | End: 2021-12-05 | Stop reason: HOSPADM

## 2021-12-02 RX ORDER — ATORVASTATIN CALCIUM 20 MG/1
20 TABLET, FILM COATED ORAL NIGHTLY
Status: DISCONTINUED | OUTPATIENT
Start: 2021-12-02 | End: 2021-12-05 | Stop reason: HOSPADM

## 2021-12-02 RX ORDER — ONDANSETRON 2 MG/ML
4 INJECTION INTRAMUSCULAR; INTRAVENOUS EVERY 6 HOURS PRN
Status: DISCONTINUED | OUTPATIENT
Start: 2021-12-02 | End: 2021-12-05 | Stop reason: HOSPADM

## 2021-12-02 RX ORDER — DONEPEZIL HYDROCHLORIDE 23 MG/1
23 TABLET, FILM COATED ORAL NIGHTLY
Status: DISCONTINUED | OUTPATIENT
Start: 2021-12-02 | End: 2021-12-02 | Stop reason: CLARIF

## 2021-12-02 RX ORDER — DONEPEZIL HYDROCHLORIDE 10 MG/1
10 TABLET, FILM COATED ORAL 2 TIMES DAILY
Status: DISCONTINUED | OUTPATIENT
Start: 2021-12-02 | End: 2021-12-05 | Stop reason: HOSPADM

## 2021-12-02 RX ADMIN — ATORVASTATIN CALCIUM 20 MG: 20 TABLET, FILM COATED ORAL at 20:18

## 2021-12-02 RX ADMIN — DONEPEZIL HYDROCHLORIDE 10 MG: 10 TABLET, FILM COATED ORAL at 11:25

## 2021-12-02 RX ADMIN — SODIUM CHLORIDE, PRESERVATIVE FREE 10 ML: 5 INJECTION INTRAVENOUS at 20:20

## 2021-12-02 RX ADMIN — Medication 1 TABLET: at 11:25

## 2021-12-02 RX ADMIN — DONEPEZIL HYDROCHLORIDE 10 MG: 10 TABLET, FILM COATED ORAL at 20:18

## 2021-12-02 RX ADMIN — CARVEDILOL 6.25 MG: 6.25 TABLET, FILM COATED ORAL at 11:25

## 2021-12-02 RX ADMIN — CEFTRIAXONE 1000 MG: 1 INJECTION, POWDER, FOR SOLUTION INTRAMUSCULAR; INTRAVENOUS at 11:27

## 2021-12-02 RX ADMIN — LABETALOL HYDROCHLORIDE 10 MG: 5 INJECTION, SOLUTION INTRAVENOUS at 22:31

## 2021-12-02 NOTE — ED NOTES
Rounded on pt - pt sleeping - daughter at bedside. Informed daughter pt has a bed assignment at [facility] we are waiting for it to be cleaned to set up transportation.      Aleena Pastrana RN  12/02/21 [id number]

## 2021-12-02 NOTE — ED NOTES
[facility] here for transport   Pt incont of urine changed and cleaned patient  All belongings sent with daughter  Attempted to call reports Nurse will call back      Landon Abel RN  12/02/21 [id number]

## 2021-12-02 NOTE — H&P
History and Physical      Name:  Antonio Claros /Age/Sex: 1932  (80 y.o. female)   MRN & CSN:  [id number] & [id number] Admission Date/Time: 2021  1:53 PM   Location:  0318/9753-I PCP: Marlon Roldan, [address] Day: 2    Assessment and Plan:   Antonio Claros is a 80 y.o.  female with PMH of hypertension, hyperlipidemia, AAA, dementia and arthritis who was transferred from an outside ED where she presented with mental status change and found to have brain lesion on CT head as well as suspected UTI. #Acute encephalopathy of unclear etiology at this point. Differentials include intracranial lesion versus UTI. Obtain MRI and MRA brain. Neurosurgery consult. Hold off Eliquis for now until after MRI. Avoid on no anticoagulants, antiplatelets or NSAIDs. Continue with ceftriaxone. Follow-up urine culture and adjust antibiotics as appropriate. Fall precautions. Monitored neuro status. LFTs are normal.  Check ammonia level if no mental status improvement    #Chronic anticoagulation with Eliquis. Hold Eliquis. #Essential hypertension on carvedilol. #Hyperlipidemia on atorvastatin. #Dementia on donepezil    Plan of care has been discussed with the RN. Diet ADULT DIET; Regular; Low Fat/Low Chol/High Fiber/PB   DVT Prophylaxis [] Lovenox, []  Heparin, [x] SCDs, [] Ambulation   GI Prophylaxis [] PPI,  [] H2 Blocker,  [] Carafate,  [x] Diet/Tube Feeds   Code Status Full Code   Disposition Patient requires continued admission due to brain lesion and awaiting neurosurgery evaluation   MDM [] Low, [] Moderate,[x]  High  Patient's risk as above due to acute encephalopathy, UTI, brain lesion, dementia and hypertension. History of Present Illness:     Chief Complaint: Confusion  Antonio Claros is a 80 y.o.  female  who presents with altered mental status. Patient is oriented only to place but disoriented in person and time but she appeared appropriate to questions.   Reliable history could not be obtained from her. History below is obtained from transferring physician yesterday and a review of EMR. Apparently patient with past medical history of dementia, hypertension, hyperlipidemia and AAA who presented with mental status change as he was said to be acting more confused. She was noted to have UTI in the ER and also CT head revealed a small punctuate hypodensity within the right frontal lobe adjacent to the frontal horn of the right lateral ventricle measuring 3 mm. Her case was discussed with the neurosurgeon Dr. Bobby Pryor and it was recommended the patient be transferred here. She was also given ceftriaxone and urine culture was sent. Ten point ROS reviewed negative, unless as noted above    Objective: Intake/Output Summary (Last 24 hours) at 12/2/2021 0940  Last data filed at 12/1/2021 1814  Gross per 24 hour   Intake 50 ml   Output    Net 50 ml      Vitals:   Vitals:    12/02/21 0909   BP: (!) 178/90   Pulse:    Resp:    Temp: 98.2 °F (36.8 °C)   SpO2:      Physical Exam:   GEN Awake female, lying in bed in no apparent distress. Appears given age. EYES Pupils are equally round. No scleral erythema, discharge, or conjunctivitis. HENT Mucous membranes are moist. No evidence of thrush. NECK Supple, no apparent thyromegaly or masses. RESP Clear to auscultation, no wheezes, rales or rhonchi. Symmetric chest movement while on room air. CARDIO/VASC Significant precordial heave noted. S1/S2 auscultated. Regular rate with with grade 2/6 pansystolic murmurs, but no rubs, or gallops. No JVD or carotid bruits. Peripheral pulses equal bilaterally and palpable. No peripheral edema. GI Abdomen is soft without significant tenderness, masses, or guarding. Bowel sounds are normoactive.  No costovertebral angle tenderness. Marrero catheter is not present. HEME/LYMPH No palpable cervical lymphadenopathy and no hepatosplenomegaly. No petechiae or ecchymoses.   MSK No gross joint deformities. SKIN Normal coloration, warm, dry. NEURO Cranial nerves appear grossly intact, normal speech, no lateralizing weakness. PSYCH Awake, alert, oriented x 4. Affect appropriate. Past Medical History:      Past Medical History:   Diagnosis Date    AAA (abdominal aortic aneurysm) ([facility] [address] 11/08/2019    5.26 refered to CT.  Arthritis     Hyperlipidemia     Hypertension      PSHX:  has a past surgical history that includes Hysterectomy. Allergies: No Known Allergies    FAM HX: family history includes Breast Cancer in her sister; Coronary Art Dis in her sister; Diabetes in her mother; Heart Attack in her brother, brother, and maternal grandfather; Heart Disease in her brother and mother. Soc HX:   Social History     Socioeconomic History    Marital status:      Spouse name: None    Number of children: None    Years of education: None    Highest education level: None   Occupational History    None   Tobacco Use    Smoking status: Former Smoker    Smokeless tobacco: Never Used   Substance and Sexual Activity    Alcohol use: No    Drug use: No    Sexual activity: Not Currently     Partners: Male   Other Topics Concern    None   Social History Narrative    None     Social Determinants of Health     Financial Resource Strain:     Difficulty of Paying Living Expenses: Not on file   Food Insecurity:     Worried About Running Out of Food in the Last Year: Not on file    [place] of Food in the Last Year: Not on file   Transportation Needs:     Lack of Transportation (Medical): Not on file    Lack of Transportation (Non-Medical):  Not on file   Physical Activity:     Days of Exercise per Week: Not on file    Minutes of Exercise per Session: Not on file   Stress:     Feeling of Stress : Not on file   Social Connections:     Frequency of Communication with Friends and Family: Not on file    Frequency of Social Gatherings with Friends and Family: Not on file    Attends Confucianist Services: Not on file    Active Member of Clubs or Organizations: Not on file    Attends Club or Organization Meetings: Not on file    Marital Status: Not on file   Intimate Partner Violence:     Fear of Current or Ex-Partner: Not on file    Emotionally Abused: Not on file    Physically Abused: Not on file    Sexually Abused: Not on file   Housing Stability:     Unable to Pay for Housing in the Last Year: Not on file    Number of Places Lived in the Last Year: Not on file    Unstable Housing in the Last Year: Not on file       Medications:   Medications:    sodium chloride flush  5-40 mL IntraVENous 2 times per day    enoxaparin  30 mg SubCUTAneous Daily    cefTRIAXone (ROCEPHIN) IV  1,000 mg IntraVENous Q24H      Infusions:    sodium chloride       PRN Meds: sodium chloride flush, 5-40 mL, PRN  sodium chloride, 25 mL, PRN  ondansetron, 4 mg, Q8H PRN   Or  ondansetron, 4 mg, Q6H PRN  polyethylene glycol, 17 g, Daily PRN  acetaminophen, 650 mg, Q6H PRN   Or  acetaminophen, 650 mg, Q6H PRN          Electronically signed by Juanita Jade MD on 12/2/2021 at 9:40 AM

## 2021-12-03 PROCEDURE — 6360000002 HC RX W HCPCS: Performed by: INTERNAL MEDICINE

## 2021-12-03 PROCEDURE — 6370000000 HC RX 637 (ALT 250 FOR IP): Performed by: INTERNAL MEDICINE

## 2021-12-03 PROCEDURE — 2580000003 HC RX 258: Performed by: INTERNAL MEDICINE

## 2021-12-03 PROCEDURE — 1200000000 HC SEMI PRIVATE

## 2021-12-03 PROCEDURE — 94761 N-INVAS EAR/PLS OXIMETRY MLT: CPT

## 2021-12-03 PROCEDURE — 97535 SELF CARE MNGMENT TRAINING: CPT

## 2021-12-03 PROCEDURE — 99222 1ST HOSP IP/OBS MODERATE 55: CPT | Performed by: PHYSICIAN ASSISTANT

## 2021-12-03 PROCEDURE — 97166 OT EVAL MOD COMPLEX 45 MIN: CPT

## 2021-12-03 RX ORDER — CEFDINIR 300 MG/1
300 CAPSULE ORAL EVERY 12 HOURS SCHEDULED
Status: COMPLETED | OUTPATIENT
Start: 2021-12-03 | End: 2021-12-05

## 2021-12-03 RX ORDER — HYDRALAZINE HYDROCHLORIDE 20 MG/ML
10 INJECTION INTRAMUSCULAR; INTRAVENOUS EVERY 6 HOURS PRN
Status: DISCONTINUED | OUTPATIENT
Start: 2021-12-03 | End: 2021-12-05 | Stop reason: HOSPADM

## 2021-12-03 RX ORDER — CARVEDILOL 6.25 MG/1
6.25 TABLET ORAL ONCE
Status: COMPLETED | OUTPATIENT
Start: 2021-12-03 | End: 2021-12-03

## 2021-12-03 RX ORDER — CARVEDILOL 12.5 MG/1
12.5 TABLET ORAL 2 TIMES DAILY WITH MEALS
Status: DISCONTINUED | OUTPATIENT
Start: 2021-12-03 | End: 2021-12-04

## 2021-12-03 RX ADMIN — SODIUM CHLORIDE 25 ML: 9 INJECTION, SOLUTION INTRAVENOUS at 09:55

## 2021-12-03 RX ADMIN — CARVEDILOL 6.25 MG: 6.25 TABLET, FILM COATED ORAL at 09:51

## 2021-12-03 RX ADMIN — HYDRALAZINE HYDROCHLORIDE 10 MG: 20 INJECTION INTRAMUSCULAR; INTRAVENOUS at 10:05

## 2021-12-03 RX ADMIN — Medication 1 TABLET: at 09:52

## 2021-12-03 RX ADMIN — SODIUM CHLORIDE, PRESERVATIVE FREE 10 ML: 5 INJECTION INTRAVENOUS at 09:49

## 2021-12-03 RX ADMIN — CEFTRIAXONE 1000 MG: 1 INJECTION, POWDER, FOR SOLUTION INTRAMUSCULAR; INTRAVENOUS at 10:12

## 2021-12-03 RX ADMIN — DONEPEZIL HYDROCHLORIDE 10 MG: 10 TABLET, FILM COATED ORAL at 19:49

## 2021-12-03 RX ADMIN — SODIUM CHLORIDE, PRESERVATIVE FREE 10 ML: 5 INJECTION INTRAVENOUS at 19:49

## 2021-12-03 RX ADMIN — CARVEDILOL 6.25 MG: 6.25 TABLET, FILM COATED ORAL at 12:06

## 2021-12-03 RX ADMIN — CEFDINIR 300 MG: 300 CAPSULE ORAL at 19:49

## 2021-12-03 RX ADMIN — ATORVASTATIN CALCIUM 20 MG: 20 TABLET, FILM COATED ORAL at 19:49

## 2021-12-03 RX ADMIN — DONEPEZIL HYDROCHLORIDE 10 MG: 10 TABLET, FILM COATED ORAL at 09:52

## 2021-12-03 RX ADMIN — CARVEDILOL 12.5 MG: 12.5 TABLET, FILM COATED ORAL at 17:34

## 2021-12-03 NOTE — CONSULTS
Neurosurgery   Consult Note      Reason for Consult: Concern for cavernoma vs hemorrhage  Consulting Physician:Emilia Whalen MD  Attending Physician: Familia Howard MD    Date of Admission: 12/1/2021  Subjective:   CHIEF COMPLAINT: AMS    HPI:  80 y.o. 2/28/1932  Who presented to the ED 12/1/21 with AMS. She was found to have a UTI as well as a small hyperdense lesion within the right frontal lobe measuring 3mm. Patient was transferred to [facility] from a stand-alone ER for further work-up. This morning the patient is able to tell me her name, she is not able to provide much else information. She is alert and tracking around the room and is engaging. She is able to follow simple commands briskly. She does not complain of any blurred vision or headaches. I did speak with the patient's daughter via telephone. She states that her mother does have dementia, however the past week and half she has gotten significantly worse. She states that her mother usually gets like this whenever she has a UTI. Urinalysis completed here does suggest a UTI, urine culture has showed no growth at 36 hours. Patient is on eliquis. PMHx positive for AAA, HLD, HTN. PSHx positive for hysterectomy. Past Medical and Surgical History:       Diagnosis Date    AAA (abdominal aortic aneurysm) ([facility] [address] 11/08/2019    5.26 refered to CT.  Arthritis     Hyperlipidemia     Hypertension          Procedure Laterality Date    HYSTERECTOMY         Social History:    TOBACCO:   reports that she has quit smoking. She has never used smokeless tobacco.  ETOH:   reports no history of alcohol use.     Family History:       Problem Relation Age of Onset    Diabetes Mother     Heart Disease Mother     Coronary Art Dis Sister     Breast Cancer Sister     Heart Attack Brother     Heart Disease Brother     Heart Attack Maternal Grandfather     Heart Attack Brother        Current Medications:    Current Facility-Administered Medications: hydrALAZINE (APRESOLINE) injection 10 mg, 10 mg, IntraVENous, Q6H PRN  sodium chloride flush 0.9 % injection 5-40 mL, 5-40 mL, IntraVENous, 2 times per day  sodium chloride flush 0.9 % injection 5-40 mL, 5-40 mL, IntraVENous, PRN  0.9 % sodium chloride infusion, 25 mL, IntraVENous, PRN  ondansetron (ZOFRAN-ODT) disintegrating tablet 4 mg, 4 mg, Oral, Q8H PRN **OR** ondansetron (ZOFRAN) injection 4 mg, 4 mg, IntraVENous, Q6H PRN  polyethylene glycol (GLYCOLAX) packet 17 g, 17 g, Oral, Daily PRN  acetaminophen (TYLENOL) tablet 650 mg, 650 mg, Oral, Q6H PRN **OR** acetaminophen (TYLENOL) suppository 650 mg, 650 mg, Rectal, Q6H PRN  cefTRIAXone (ROCEPHIN) 1000 mg IVPB in 50 mL D5W minibag, 1,000 mg, IntraVENous, Q24H  atorvastatin (LIPITOR) tablet 20 mg, 20 mg, Oral, Nightly  carvedilol (COREG) tablet 6.25 mg, 6.25 mg, Oral, BID WC  folic acid-pyridoxine-cyancobalamin (FOLTX) 1.13-25-2 MG TABS 1 tablet, 1 tablet, Oral, Daily  donepezil (ARICEPT) tablet 10 mg, 10 mg, Oral, BID  labetalol (NORMODYNE;TRANDATE) injection 10 mg, 10 mg, IntraVENous, Q6H PRN    No Known Allergies     REVIEW OF SYSTEMS:    CONSTITUTIONAL:  negative for fevers, chills, diaphoresis, activity change, appetite change, fatigue  EYES:  negative for blurred vision, eye discharge, visual disturbance and icterus  HEENT:  negative for hearing loss, tinnitus, ear drainage, sinus pressure, nasal congestion  RESPIRATORY:  No cough, shortness of breath, hemoptysise  GASTROINTESTINAL:  negative for nausea, vomiting, diarrhea, constipation, blood in stool and abdominal pain  GENITOURINARY:  negative for frequency, dysuria, urinary incontinence, decreased urine volume, and hematuria  HEMATOLOGIC/LYMPHATIC:  negative for easy bruising, bleeding and lymphadenopathy  MUSCULOSKELETAL:  negative for pain, joint swelling, decreased range of motion and muscle weakness  NEUROLOGICAL:  negative for headaches, slurred speech, unilateral weakness  PSYCHIATRIC/BEHAVIORAL: negative for hallucinations, behavioral problems, confusion and agitation. Objective:   PHYSICAL EXAM:      VITALS:  BP (!) 180/92   Pulse 54   Temp 98.2 °F (36.8 °C) (Oral)   Resp 18   Ht 5' (1.524 m)   Wt 109 lb 9.6 oz (49.7 kg)   SpO2 100%   BMI 21.40 kg/m²      24HR INTAKE/OUTPUT:  No intake or output data in the 24 hours ending 12/03/21 0810  CONSTITUTIONAL:  Awake, alert, cooperative, no apparent distress, and appears stated age  HEENT: NCAT, PERRL, EOMI, OP with moist mucosal membranes, no thrush, tongue protrudes midline  PSYCHIATRIC: Oriented to person place and time. No obvious depression or anxiety. MUSCULOSKELETAL: No obvious misalignment or effusion of the joints. No clubbing, cyanosis of the digits. SKIN:  normal skin color, texture, turgor and no redness, warmth, or swelling. NEUROLOGIC: Alert and oriented x4, face symmetrical, no obvious droop, speech clear and coherent, able to recall 3/3 objects in 5 minutes,  left slight finger to nose dysmetria, sensation intact to light touch and pinprick sensation. Upper extremity strength: Bilateral upper extremities able to resist gravity, bilateral hand  strength equal  Lower extremity strength: Bilateral lower extremities able to resist gravity. Bilateral anteriotibialis and gastrocnemius 4/5    DATA:    Old records have been reviewed    CBC:  Recent Labs     12/01/21  1419 12/02/21  1144   WBC 7.1 8.3   RBC 2.96* 3.15*   HGB 10.0* 10.7*   HCT 30.3* 32.8*    154   .4* 104.1*   MCH 33.8* 34.0*   MCHC 33.0 32.6   RDW 13.3 13.1   SEGSPCT 63.1 65.7      BMP:  Recent Labs     12/01/21  1419 12/02/21  1144    144   K 4.5 4.0    107   CO2 24 23   BUN 20 18   CREATININE 1.1 1.0   CALCIUM 10.6 10.4   GLUCOSE 97 74          Radiology Review:  All pertinent images / reports were reviewed as a part of this visit. CT head 12/2/21  Impression   1.  Stable 4 mm hyperdense focus in the periventricular white matter adjacent   to the anterior horn right lateral ventricle.  Given that there was focal   susceptibility artifact in this location on MRI performed 09/17/2018, this   may represent a cavernoma, although small focus of acute on chronic   hemorrhage can have this appearance.  MRI of the brain with and without   contrast is recommended for further evaluation. 2. Stable diffuse parenchymal volume loss and chronic white matter   microvascular ischemic changes. 3. Stable right frontal encephalomalacia in keeping with sequela of prior   infarct. CT head 12/1/21  Impression   1. Small punctate hyperdensity within the right frontal lobe adjacent to the   frontal horn of the right lateral ventricle measuring 3 mm.  This could   represent a small cavernoma, venous angioma versus a punctate hemorrhagic   contusion.  An MRI brain is suggested for a brief       Assessment:   AMS  UTI  Hyperdensity in right frontal lobe   Plan:   Patient presented to the ED on 12/1/2021 with complaints of altered mental status had been progressive over the past week and a half. The patient is a poor historian, I did speak with her daughter via telephone about her history. She states that her mom does have dementia and that her dementia worsens whenever she has a UTI. In the emergency room her urinalysis sis suggested a UTI, however her urine culture showed no growth at 36 hours. A CT scan of the head revealed a hyperdense area within the right frontal lobe. An MRI brain with and without contrast is suggested for further evaluation. Differential diagnosis could be cavernoma versus small punctate hemorrhage. Repeat head CT showed no change in the hyperdense area. This hyperdense lesion was seen on an MRI from 2018. This most likely is a cavernoma. MRI is pending. Electronically signed by Maria Aguilar PA-C on 12/3/2021 at 8:10 AM    Supervising physician: Marisa Harris.  Senthil Sapp MD.  Dr. Senthil Sapp was readily and continuously available by phone for direct consultation regarding the care of this patient. Time spent with patient in consultation, education, and collaboration with medical time is >50% of total time spent on case, including time spent in chart review and dictation. Total time spent: 40 minutes    Thank you for the opportunity to participate in the care of your patient.

## 2021-12-03 NOTE — PROGRESS NOTES
Occupational Therapy  [facility] OCCUPATIONAL THERAPY EVALUATION    History  [employer]:  The primary encounter diagnosis was Altered mental status, unspecified altered mental status type. Diagnoses of Acute cystitis with hematuria, Anemia, unspecified type, Abnormal CT of the head, and Right hip pain were also pertinent to this visit. Restrictions:                           Communication with other providers: RN, aide    Subjective:  Patient states:  Speaks only to answer Qs  Pain:  none  Patient goal:  home    Occupational profile (relevant social history and personal factors):    Social/Functional History  Lives With: Alone (+ little dog)  Bathroom Toilet: Handicap height  Home Equipment: 4 wheeled walker  Receives Help From: Family  ADL Assistance: Independent (toilets LEEN, family in as much as possble to assist with dressing/bathing)  Homemaking Responsibilities: Yes (but family primarily manages, pt home alone at times and performs light kitchen duties)  Ambulation Assistance: Independent (reaches for environmental support around the house ; does not walk farther than household distances)  Transfer Assistance: Independent  Active : No    Examination of body systems (includes body structures/functions, activity/participation limitations):  · Orientation: ox self and place  · Cognition:  Dementia, needs repetition frequently to follow basic commands. · Observation:  Received pt in chair. Alert and cooperative. Great-grandson present and hands on assisting   · Vision:  WFL   · Hearing:  [place]  · ROM:  WFL BUE  · Strength: WFL BUE  · Sensation: not tested    ADLs  Feeding: setup    Grooming: setup once return to bed, too fatigued to stand sinkside after toileting    Dressing: UB CGA in seated LB MaxA depends threading and hiking at commode    Bathing: UB CGA in seated LB ModA, can manage some of LE in seated    Toileting: MaxA, buttock hygiene and depends hiking.      *Some ADL determined per observation of actual ADL performance, functional mobility, balance, activity tolerance, and cognition. AM-PAC 6 click short form for inpatient daily activity:  Raw Score: 17  24/24 = unimpaired  23/24 = 1-20% impaired   20/24-22/24 = 21-40% impaired  15/24-19/24 = 41-59% impaired   10/24-14/24 = 60%-79% impaired  7/24-9/24 = 80%-99% impaired  6/24 = 100% impaired    Functional Mobility  Bed mobility:   Supine to sit: not tested  Sit to supine: SBA    Sitting balance: good    Transfers:   Sit to stand: Zeus from low chair, CGA from low commode c cue to use grab bar  Stand to sit: Zeus to low commode and chair, reduced control and fatigued, does not reach back unless cued    Standing balance: CGA c 1-2 UE support while dependent buttock hygiene performed    Ambulation:  CGA/hand hold to/from bathroom, cues for direction and sequencing change in direction when approaching a seat or bed . Tried RW for a few feet but pt unable to manage due to cognitive impairments. Activity tolerance  Below baseline, general fatigue    Assessment:  Assessment  Performance deficits / Impairments: Decreased functional mobility , Decreased ADL status, Decreased cognition, Decreased safe awareness, Decreased endurance, Decreased high-level IADLs, Decreased balance  Treatment Diagnosis: AMS, anemia  Prognosis: Good  Decision Making: Medium Complexity  REQUIRES OT FOLLOW UP: Yes  Discharge Recommendations: [address] (UNless family can provide 24/7, hands on for mobility and ADL while she is initially recovering at home. Rec HH OT and aide.)      Goals:  By d/c or goals met:     Pt will perform all bed mobility with SBA in prep for EOB/OOB activity. Pt will perform all functional transfers with SBA and appropriate use of LRD to bed, toilet, chair in prep for increased functional independence. Pt will perform UB ADLs with SBA to increase functional independence.   Pt will perform LB ADLs with SBA to increase functional independence. Pt will perform all aspects of toileting with SBA to increase functional independence. Pt will participate in therex/therax c emphasis on strength, activity tolerance,  safety, ELEN tasks. Plan:  Plan  Times per week: 3x      Recommendation for activity with nursing staff:  ambulate to bathroom for toileting    Treatment today:    Self Care Training:   Self care training was performed today. Cues were given for safety, sequence, UE/LE placement, visual cues, and balance. Activities performed today included dressing, toileting, hand hygiene, and grooming. Education: Role of OT, OT POC, d/c needs, home safety    Safety: Left in bed with all needs in reach. Gait belt used for transfer and mobility. Time in:  1415  Time out:  1440  Timed treatment minutes:  12  Total treatment time:  25    Electronically signed by:    410Eda Garcia, OTR/L, [place]   [id number]   3:53 PM, 12/3/2021

## 2021-12-03 NOTE — PROGRESS NOTES
Physician Progress Note      Elaine Rodriguez  [facility] #:                  [id number]  :                       1932  ADMIT DATE:       2021 1:53 PM  DISCH DATE:  RESPONDING  PROVIDER #:        Nabil Velazquez          QUERY TEXT:    Hospitalists,    Pt admitted with suspected UTI and has encephalopathy documented. If possible,   please document in progress notes and discharge summary further specificity   regarding the type of encephalopathy:    The medical record reflects the following:  Risk Factors: suspected UTI, dementia  Clinical Indicators: documentation of confusion, AMS and encephalopathy,   frontal lobe lesion noted on imaging, //101  Treatment: labs, Neuro consult, observation, neuro checks, IV labetalol    Thank you,  Amanda Light  Options provided:  -- Hypertensive encephalopathy  -- Metabolic encephalopathy  -- Encephalopathy due to ##please specify(other, such as CVA or brain tumor),   please specify(other, such as CVA or brain tumor). -- Other - I will add my own diagnosis  -- Disagree - Not applicable / Not valid  -- Disagree - Clinically unable to determine / Unknown  -- Refer to Clinical Documentation Reviewer    PROVIDER RESPONSE TEXT:    Already documented  .  Please check progress note    Query created by: Brittany Palomino on 12/3/2021 12:24 PM      Electronically signed by:  Nabil Velazquez 12/3/2021 2:54 PM

## 2021-12-03 NOTE — PROGRESS NOTES
Hospitalist Progress Note      Name:  Eddie Knutson /Age/Sex: 1932  (80 y.o. female)   MRN & CSN:  [id number] & [id number] Admission Date/Time: 2021  1:53 PM   Location:  0682/0282-D PCP: Dorina Adan, [facility] Day: 3    Assessment and Plan:   Eddie Knutson is a 80 y.o.  female with past medical history of recurrent UTIs,Hypertension, hyperlipidemia, dementia, was admitted on 2021 for evaluation of altered mental status. Patient's initial urinalysis was suggestive of UTI however no growth on urine culture. Given patient's symptoms of increasing confusion and improvement with antibiotics, will treat as UTI at this time. Also, CT brain noted to have hypodense area within the right frontal lobe. 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 However, differential includes small punctate hemorrhage. Eliquis is on hold. MRI brain with and without contrast was ordered. Acute metabolic encephalopathy  -Likely secondary to infectious etiology  -Improved  -Currently at her baseline mentation  -PT/OT evaluation    Acute cystitis  -Stop IV ceftriaxone  -Start p.o. cefdinir to complete 5-day course    Abnormal CT head  -Hypodense area in the right frontal lobe  -Differential includes cavernoma versus small punctate hemorrhage  -Neurosurgery evaluation appreciated  -Await MRI brain    History of CVA  -Echocardiogram  revealing presence of PFO  -Patient on chronic anticoagulation with Eliquis. Currently on hold till MRI brain is completed. Resume once okay with neurosurgery    Hypertension  -Elevated  -Increase Coreg to 12.5 mg p.o. twice daily with holding parameters    Dementia  -Continue Aricept  -Continue supportive care    DVT prophylaxis  -SCDs for now    Patient's daughter Lashae Rao present in the room. Discussed the plan. Diet ADULT DIET;  Regular; Low Fat/Low Chol/High Fiber/PB   DVT Prophylaxis [] Lovenox, []  Heparin, [x] SCDs, [] Ambulation   GI Prophylaxis [] PPI,  [] H2 Blocker,  [] Carafate,  [] Diet/Tube Feeds   Code Status Full Code   Disposition Patient requires continued admission due to awaiting MRI   MDM [] Low, [x] Moderate,[]  High  Patient's risk as above due to encephalopathy/abnormal CT head/uncontrolled hypertension     History of Present Illness:     Chief Complaint: See above    Patient seen and examined at bedside. Daughter present in the room. Patient denies any acute concerns at this time. Poor historian due to cognitive impairment. No acute overnight event noted. Appears to be at her baseline mentation. Ten point ROS reviewed negative, unless as noted above    Objective:   No intake or output data in the 24 hours ending 12/03/21 1320   Vitals:   Vitals:    12/03/21 0900   BP: (!) 189/84   Pulse: 51   Resp: 18   Temp: 98.3 °F (36.8 °C)   SpO2: 98%     Physical Exam:   GEN no acute distress  RESP Clear to auscultation, no wheezes, rales or rhonchi. Symmetric chest movement while on room air. CARDIO/VASC S1/S2 auscultated. Regular rate. No peripheral edema. GI Abdomen is soft without significant tenderness, masses, or guarding. Bowel sounds are normoactive. NEURO AAO x2. No gross focal neurological deficits evident at this time.     Medications:   Medications:    carvedilol  12.5 mg Oral BID WC    sodium chloride flush  5-40 mL IntraVENous 2 times per day    cefTRIAXone (ROCEPHIN) IV  1,000 mg IntraVENous Q24H    atorvastatin  20 mg Oral Nightly    folic acid-pyridoxine-cyancobalamin  1 tablet Oral Daily    donepezil  10 mg Oral BID      Infusions:    sodium chloride 25 mL (12/03/21 0955)     PRN Meds: hydrALAZINE, 10 mg, Q6H PRN  sodium chloride flush, 5-40 mL, PRN  sodium chloride, 25 mL, PRN  ondansetron, 4 mg, Q8H PRN   Or  ondansetron, 4 mg, Q6H PRN  polyethylene glycol, 17 g, Daily PRN  acetaminophen, 650 mg, Q6H PRN   Or  acetaminophen, 650 mg, Q6H PRN  labetalol, 10 mg, Q6H PRN          Electronically signed by Roxanne Cornell MD on 12/3/2021 at 1:20 PM

## 2021-12-04 ENCOUNTER — APPOINTMENT (OUTPATIENT)
Dept: MRI IMAGING | Age: 86
DRG: 689 | End: 2021-12-04
Payer: MEDICARE

## 2021-12-04 LAB
ANION GAP SERPL CALCULATED.3IONS-SCNC: 10 MMOL/L (ref 4–16)
BUN BLDV-MCNC: 24 MG/DL (ref 6–23)
CALCIUM SERPL-MCNC: 9.7 MG/DL (ref 8.3–10.6)
CHLORIDE BLD-SCNC: 107 MMOL/L (ref 99–110)
CO2: 24 MMOL/L (ref 21–32)
CREAT SERPL-MCNC: 1.2 MG/DL (ref 0.6–1.1)
GFR AFRICAN AMERICAN: 51 ML/MIN/1.73M2
GFR NON-AFRICAN AMERICAN: 42 ML/MIN/1.73M2
GLUCOSE BLD-MCNC: 99 MG/DL (ref 70–99)
HCT VFR BLD CALC: 29.3 % (ref 37–47)
HEMOGLOBIN: 9.6 GM/DL (ref 12.5–16)
MAGNESIUM: 2 MG/DL (ref 1.8–2.4)
MCH RBC QN AUTO: 33.4 PG (ref 27–31)
MCHC RBC AUTO-ENTMCNC: 32.8 % (ref 32–36)
MCV RBC AUTO: 102.1 FL (ref 78–100)
PDW BLD-RTO: 13.1 % (ref 11.7–14.9)
PLATELET # BLD: 127 K/CU MM (ref 140–440)
PMV BLD AUTO: 10.5 FL (ref 7.5–11.1)
POTASSIUM SERPL-SCNC: 4.1 MMOL/L (ref 3.5–5.1)
RBC # BLD: 2.87 M/CU MM (ref 4.2–5.4)
SODIUM BLD-SCNC: 141 MMOL/L (ref 135–145)
WBC # BLD: 8.6 K/CU MM (ref 4–10.5)

## 2021-12-04 PROCEDURE — 1200000000 HC SEMI PRIVATE

## 2021-12-04 PROCEDURE — 85027 COMPLETE CBC AUTOMATED: CPT

## 2021-12-04 PROCEDURE — 94761 N-INVAS EAR/PLS OXIMETRY MLT: CPT

## 2021-12-04 PROCEDURE — 6370000000 HC RX 637 (ALT 250 FOR IP): Performed by: INTERNAL MEDICINE

## 2021-12-04 PROCEDURE — 36415 COLL VENOUS BLD VENIPUNCTURE: CPT

## 2021-12-04 PROCEDURE — 83735 ASSAY OF MAGNESIUM: CPT

## 2021-12-04 PROCEDURE — 2580000003 HC RX 258: Performed by: INTERNAL MEDICINE

## 2021-12-04 PROCEDURE — 97162 PT EVAL MOD COMPLEX 30 MIN: CPT

## 2021-12-04 PROCEDURE — 80048 BASIC METABOLIC PNL TOTAL CA: CPT

## 2021-12-04 PROCEDURE — 97112 NEUROMUSCULAR REEDUCATION: CPT

## 2021-12-04 RX ORDER — CARVEDILOL 6.25 MG/1
6.25 TABLET ORAL 2 TIMES DAILY WITH MEALS
Status: DISCONTINUED | OUTPATIENT
Start: 2021-12-05 | End: 2021-12-05 | Stop reason: HOSPADM

## 2021-12-04 RX ADMIN — CEFDINIR 300 MG: 300 CAPSULE ORAL at 10:28

## 2021-12-04 RX ADMIN — ATORVASTATIN CALCIUM 20 MG: 20 TABLET, FILM COATED ORAL at 20:43

## 2021-12-04 RX ADMIN — CARVEDILOL 12.5 MG: 12.5 TABLET, FILM COATED ORAL at 17:18

## 2021-12-04 RX ADMIN — CARVEDILOL 12.5 MG: 12.5 TABLET, FILM COATED ORAL at 10:32

## 2021-12-04 RX ADMIN — ACETAMINOPHEN 650 MG: 325 TABLET ORAL at 03:55

## 2021-12-04 RX ADMIN — CEFDINIR 300 MG: 300 CAPSULE ORAL at 20:43

## 2021-12-04 RX ADMIN — SODIUM CHLORIDE, PRESERVATIVE FREE 10 ML: 5 INJECTION INTRAVENOUS at 10:28

## 2021-12-04 RX ADMIN — DONEPEZIL HYDROCHLORIDE 10 MG: 10 TABLET, FILM COATED ORAL at 20:43

## 2021-12-04 RX ADMIN — Medication 1 TABLET: at 10:28

## 2021-12-04 RX ADMIN — SODIUM CHLORIDE, PRESERVATIVE FREE 10 ML: 5 INJECTION INTRAVENOUS at 20:43

## 2021-12-04 RX ADMIN — DONEPEZIL HYDROCHLORIDE 10 MG: 10 TABLET, FILM COATED ORAL at 10:28

## 2021-12-04 ASSESSMENT — PAIN SCALES - GENERAL
PAINLEVEL_OUTOF10: 1
PAINLEVEL_OUTOF10: 0

## 2021-12-04 NOTE — PROGRESS NOTES
Physical Therapy  [facility] ACUTE CARE PHYSICAL THERAPY EVALUATION  Rubin Castaneda, 2/28/1932, 3117/3117-A, 12/4/2021    History  [place]:  The primary encounter diagnosis was Altered mental status, unspecified altered mental status type. Diagnoses of Acute cystitis with hematuria, Anemia, unspecified type, Abnormal CT of the head, and Right hip pain were also pertinent to this visit. Patient  has a past medical history of AAA (abdominal aortic aneurysm) ([address] Arthritis, Hyperlipidemia, and Hypertension. Patient  has a past surgical history that includes Hysterectomy. Subjective:  Patient states:  \"I am cold and would like to get back in bed\". Pain:  0/10. Communication with other providers:  Handoff to RN. Restrictions: fall risk. Home Setup/Prior level of function  Social/Functional History  Lives With: Alone (+ little dog.  Anticipates gradson moving in with her for 24 hour supervision.)  Type of Home: House  Home Layout: Two level, Able to Live on Main level with bedroom/bathroom  Home Access: Level entry  Bathroom Shower/Tub: Walk-in shower  Bathroom Toilet: Handicap height  Home Equipment: 4 wheeled walker  Receives Help From: Family  ADL Assistance: Independent (toilets ELEN, family in as much as possble to assist with dressing/bathing)  Homemaking Responsibilities: Yes (but family primarily manages, pt home alone at times and performs light kitchen duties)  Ambulation Assistance: Independent (reaches for environmental support around the house ; does not walk farther than household distances)  Transfer Assistance: Independent  Active : No  Occupation: Retired  Type of occupation: previously worked at [employer] (includes body structures/functions, activity/participation limitations):  · Observation:  Sitting in chair upon arrival  · Vision:  [facility]  · Hearing:  [place]  · Cardiopulmonary:  SP02 99%, HR 54 bpm  · Cognition: oriented to self and place but not oriented to date/year/month, see OT/SLP note for further evaluation. Musculoskeletal  · ROM R/L:  WFL. · Strength R/L:  -4/5, RLE and LLE in function and endurance. · Neuro:  [facility]      Mobility:  · Rolling L/R:  SBA  · Supine to sit:  Min A  · Transfers: from couch and from chair max A. Pt pushing up from chair rather than pulling on walker. · Sitting balance:  good. · Standing balance:  fair. · Gait: decreased amaury and step length. Pt with forward bent posturing for ambulation with FWW. Pt with decreased SP02 to 80% within 5 minutes of ambulation. Pt requires seated rest break to recover as standing rest break with cued diaphragmatic breathing does not allow for SP02 recovery. Once seated pt SP02 returns to 99% within 1 minute. Pt ambulating with CGA and min A for walker management for 15 ft.     [facility] 6 Clicks Inpatient Mobility:  AM-PAC Inpatient Mobility Raw Score : 13    Treatment:  Transitional movements including rolling in bed, sit <> stand at couch and chair. Ambulation with FWW in room. Safety: patient left in bed, call light within reach, RN notified, gait belt used. Assessment:  Pt is a 80year old female with hospital admission for altered mental status. Pt denies pain and is agreeable for participation in therapy today. Prior to admission pt was independent, ambulating with 4 wheeled walker for household distances and received help from family as needed for cooking and cleaning home. Today pt demonstrates below baseline levels and would therefor benefit form continued skilled services to address functional deficits and balance. Pt would also benefit from 24 hour supervision to prevent falls and home health therapy upon d/c. If pt is unable to receive 24 hour supervision within home pt will benefit from SNF to continue to address ADL dysfunction and fall risk. Complexity: Moderate  Prognosis: Good, no significant barriers to participation at this time.   Plan Times per week: 3/week, 1 week  Discharge Recommendations: 24 hour supervision or assist, Home with Home health PT, [address] [place] [address] Ave: none    Goals:  Short term goals  Short term goal 1: Pt will perform supine <> sitting with  CGA. Short term goal 2: Pt will perform sitting <> standing with CGA. Short term goal 3: Pt will ambulate with FWW for 25 ft with SBA. Treatment plan:  Bed mobility, transfers, balance, gait, TA, TX.     Recommendations for NURSING mobility: CGA with FWW for ambulation for short distances    Time:   Time in: 1300  Time out: 1334  Timed treatment minutes: 15  Total time: 34    Electronically signed by:    Blaze Harrell PT  12/4/2021, 2:33 PM

## 2021-12-04 NOTE — CARE COORDINATION
Attempted to meet with patient; she is very drowsy and is unable to complete conversation. Attempted to contact daughter Terrance Rodriguez at [phone]; [facility] .  Naveen Sandhu RN

## 2021-12-04 NOTE — PROGRESS NOTES
Hospitalist Progress Note      Name:  Charlie De Jesus /Age/Sex: 1932  (80 y.o. female)   MRN & CSN:  [id number] & [id number] Admission Date/Time: 2021  1:53 PM   Location:  0761/0947-W PCP: Dai HUNTER [facility] Day: 4    Assessment and Plan:   Charlie De Jesus is a 80 y.o.  female with past medical history of recurrent UTIs,Hypertension, hyperlipidemia, dementia, was admitted on 2021 for evaluation of altered mental status. Patient's initial urinalysis was suggestive of UTI however no growth on urine culture. Given patient's symptoms of increasing confusion and improvement with antibiotics, will treat as UTI at this time. Also, CT brain noted to have hypodense area within the right frontal lobe. 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 However, differential includes small punctate hemorrhage. Eliquis is on hold. MRI brain with and without contrast was ordered. Acute metabolic encephalopathy  -Likely secondary to infectious etiology  -Improved, discussed with her daughter who is agreeable to discharge  -Currently at her baseline mentation  -PT/OT evaluation    Acute cystitis  -Stop IV ceftriaxone  -Start p.o. cefdinir to complete 5-day course    Abnormal CT head  -Hypodense area in the right frontal lobe  -Differential includes cavernoma versus small punctate hemorrhage  -Neurosurgery evaluation appreciated  -Await MRI brain    History of CVA  -Echocardiogram  revealing presence of PFO  -Patient on chronic anticoagulation with Eliquis. Currently on hold till MRI brain is completed. Resume once okay with neurosurgery    Hypertension  -Elevated  -Increase Coreg to 12.5 mg p.o. twice daily with holding parameters    Dementia  -Continue Aricept  -Continue supportive care    DVT prophylaxis  -SCDs for now    Discussed with her daughter Homer Scott who is eager for her to get discharge and come home. .  Discussed the plan. Diet ADULT DIET;  Regular; Low Fat/Low Chol/High Fiber/PB   DVT Prophylaxis [] Lovenox, []  Heparin, [x] SCDs, [] Ambulation   GI Prophylaxis [] PPI,  [] H2 Blocker,  [] Carafate,  [] Diet/Tube Feeds   Code Status Full Code   Disposition Patient requires continued admission due to awaiting MRI   MDM [] Low, [x] Moderate,[]  High  Patient's risk as above due to encephalopathy/abnormal CT head/uncontrolled hypertension     History of Present Illness:     Chief Complaint: See above    Patient seen and examined at bedside. Patient is comfortable in her room and has just finished her breakfast.  She is at her baseline but thinks that she is in [employer]. I redirect her. Discussed with the daughter. Who is eager to get her home. Discussed with nursing staff. Await MRI. Hopeful of discharge later today or tomorrow. Ten point ROS reviewed negative, unless as noted above    Objective: Intake/Output Summary (Last 24 hours) at 12/4/2021 0949  Last data filed at 12/3/2021 1949  Gross per 24 hour   Intake 10 ml   Output    Net 10 ml      Vitals:   Vitals:    12/04/21 0800   BP: 118/70   Pulse: 59   Resp: 20   Temp:    SpO2: 97%     Physical Exam:   GEN no acute distress  RESP Clear to auscultation, no wheezes, rales or rhonchi. Symmetric chest movement while on room air. CARDIO/VASC S1/S2 auscultated. Regular rate. No peripheral edema. GI Abdomen is soft without significant tenderness, masses, or guarding. Bowel sounds are normoactive. NEURO AAO x2. No gross focal neurological deficits evident at this time.     Medications:   Medications:    carvedilol  12.5 mg Oral BID WC    cefdinir  300 mg Oral 2 times per day    sodium chloride flush  5-40 mL IntraVENous 2 times per day    atorvastatin  20 mg Oral Nightly    folic acid-pyridoxine-cyancobalamin  1 tablet Oral Daily    donepezil  10 mg Oral BID      Infusions:    sodium chloride 25 mL (12/03/21 0955)     PRN Meds: hydrALAZINE, 10 mg, Q6H PRN  sodium chloride flush, 5-40 mL, PRN  sodium chloride, 25 mL, PRN  ondansetron, 4 mg, Q8H PRN   Or  ondansetron, 4 mg, Q6H PRN  polyethylene glycol, 17 g, Daily PRN  acetaminophen, 650 mg, Q6H PRN   Or  acetaminophen, 650 mg, Q6H PRN  labetalol, 10 mg, Q6H PRN          Electronically signed by Candido Agudelo MD on 12/4/2021 at 9:49 AM

## 2021-12-05 ENCOUNTER — APPOINTMENT (OUTPATIENT)
Dept: MRI IMAGING | Age: 86
DRG: 689 | End: 2021-12-05
Payer: MEDICARE

## 2021-12-05 VITALS
SYSTOLIC BLOOD PRESSURE: 159 MMHG | DIASTOLIC BLOOD PRESSURE: 87 MMHG | HEIGHT: 60 IN | WEIGHT: 109.6 LBS | HEART RATE: 56 BPM | TEMPERATURE: 97.6 F | RESPIRATION RATE: 21 BRPM | BODY MASS INDEX: 21.52 KG/M2 | OXYGEN SATURATION: 98 %

## 2021-12-05 PROCEDURE — 6360000004 HC RX CONTRAST MEDICATION: Performed by: PHYSICIAN ASSISTANT

## 2021-12-05 PROCEDURE — 99232 SBSQ HOSP IP/OBS MODERATE 35: CPT | Performed by: PHYSICIAN ASSISTANT

## 2021-12-05 PROCEDURE — 6370000000 HC RX 637 (ALT 250 FOR IP): Performed by: INTERNAL MEDICINE

## 2021-12-05 PROCEDURE — A9577 INJ MULTIHANCE: HCPCS | Performed by: PHYSICIAN ASSISTANT

## 2021-12-05 PROCEDURE — 94761 N-INVAS EAR/PLS OXIMETRY MLT: CPT

## 2021-12-05 PROCEDURE — 70553 MRI BRAIN STEM W/O & W/DYE: CPT

## 2021-12-05 RX ORDER — AMLODIPINE BESYLATE 5 MG/1
5 TABLET ORAL DAILY
Qty: 30 TABLET | Refills: 0 | Status: SHIPPED | OUTPATIENT
Start: 2021-12-05

## 2021-12-05 RX ADMIN — GADOBENATE DIMEGLUMINE 6 ML: 529 INJECTION, SOLUTION INTRAVENOUS at 11:31

## 2021-12-05 RX ADMIN — Medication 1 TABLET: at 08:44

## 2021-12-05 RX ADMIN — CEFDINIR 300 MG: 300 CAPSULE ORAL at 08:44

## 2021-12-05 RX ADMIN — DONEPEZIL HYDROCHLORIDE 10 MG: 10 TABLET, FILM COATED ORAL at 08:44

## 2021-12-05 NOTE — DISCHARGE SUMMARY
medications, and plan. Consults this admission:  IP CONSULT TO HOSPITALIST  IP CONSULT TO NEUROSURGERY    Discharge Instruction:   Follow up appointments: Neurosurgery  Primary care physician:  within 2 weeks    Diet:  cardiac diet   Activity: activity as tolerated  Disposition: Discharged to:   []Home, []C, []SNF, []Acute Rehab, []Hospice   Condition on discharge: Stable    Discharge Medications:        Medication List      START taking these medications    amLODIPine 5 MG tablet  Commonly known as: NORVASC  Take 1 tablet by mouth daily        CONTINUE taking these medications    atorvastatin 20 MG tablet  Commonly known as: LIPITOR  Take 1 tablet by mouth nightly     Donepezil HCl 23 MG Tabs tablet  Commonly known as: ARICEPT     folic acid-pyridoxine-cyancobalamin 1.13-25-2 MG Tabs  Take 1 tablet by mouth daily     VITAMIN D3 COMPLETE PO     zinc 50 MG Tabs tablet        STOP taking these medications    apixaban 2.5 MG Tabs tablet  Commonly known as: ELIQUIS     carvedilol 6.25 MG tablet  Commonly known as: COREG           Where to Get Your Medications      These medications were sent to [address], [address] [place] [address] Dear Demond [phone] Fatuma Yoon [address] [place] [address], [address] [place] [id number]    Phone: [phone]   · amLODIPine 5 MG tablet         Objective Findings at Discharge:   BP (!) 159/87   Pulse 56   Temp 97.6 °F (36.4 °C) (Oral)   Resp 21   Ht 5' (1.524 m)   Wt 109 lb 9.6 oz (49.7 kg)   SpO2 98%   BMI 21.40 kg/m²            PHYSICAL EXAM GEN    no acute distress  RESP  Clear to auscultation, no wheezes, rales or rhonchi. Symmetric chest movement while on room air. CARDIO/VASC           S1/S2 auscultated. Regular rate. No peripheral edema. GI        Abdomen is soft without significant tenderness, masses, or guarding. Bowel sounds are normoactive. NEURO           AAO x2.   No gross focal neurological deficits evident at this timet

## 2021-12-05 NOTE — PROGRESS NOTES
Hospitalist Progress Note      Name:  Carlin Hart /Age/Sex: 1932  (80 y.o. female)   MRN & CSN:  [id number] & [id number] Admission Date/Time: 2021  1:53 PM   Location:  9428/7223-U PCP: Edel Babcock [address] [place] [address] Day: 5    Assessment and Plan:   Carlin Hart is a 80 y.o.  female with past medical history of recurrent UTIs,Hypertension, hyperlipidemia, dementia, was admitted on 2021 for evaluation of altered mental status. Patient's initial urinalysis was suggestive of UTI however no growth on urine culture. Given patient's symptoms of increasing confusion and improvement with antibiotics, will treat as UTI at this time. Also, CT brain noted to have hypodense area within the right frontal lobe. 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 However, differential includes small punctate hemorrhage. Eliquis is on hold. MRI brain with and without contrast was ordered. Acute metabolic encephalopathy  -Likely secondary to infectious etiology  -Improved, discussed with her daughter who is agreeable to discharge  -Currently at her baseline mentation  -PT/OT evaluation    Acute cystitis  -Stop IV ceftriaxone  -Start p.o. cefdinir to complete 5-day course    Abnormal CT head  -Hypodense area in the right frontal lobe  -Differential includes cavernoma versus small punctate hemorrhage  -Neurosurgery evaluation appreciated  -Await MRI brain    History of CVA  -Echocardiogram  revealing presence of PFO  -Patient on chronic anticoagulation with Eliquis. Currently on hold till MRI brain is completed. Resume once okay with neurosurgery    Hypertension  -Elevated  -Increase Coreg to 12.5 mg p.o. twice daily with holding parameters    Dementia  -Continue Aricept  -Continue supportive care    DVT prophylaxis  -SCDs for now    Discussed with her daughter Laith mcgowan who is eager for her to get discharge and come home. .  Discussed the plan. Diet ADULT DIET;  Regular; Low Fat/Low Chol/High Fiber/PB   DVT Prophylaxis [] Lovenox, []  Heparin, [x] SCDs, [] Ambulation   GI Prophylaxis [] PPI,  [] H2 Blocker,  [] Carafate,  [] Diet/Tube Feeds   Code Status Full Code   Disposition Patient requires continued admission due to awaiting MRI   MDM [] Low, [x] Moderate,[]  High  Patient's risk as above due to encephalopathy/abnormal CT head/uncontrolled hypertension     History of Present Illness:     Chief Complaint: See above    Patient seen and examined at bedside. Patient is comfortable in her room and has just finished her breakfast.  She is at her baseline but thinks that she is in [place]. I redirect her. Discussed with the daughter. Who is eager to get her home. Discussed with nursing staff. Await MRI. Hopeful of discharge later today or tomorrow. Ten point ROS reviewed negative, unless as noted above    Objective: Intake/Output Summary (Last 24 hours) at 12/5/2021 1050  Last data filed at 12/4/2021 2043  Gross per 24 hour   Intake 10 ml   Output    Net 10 ml      Vitals:   Vitals:    12/05/21 1024   BP:    Pulse:    Resp: 20   Temp:    SpO2: 98%     Physical Exam:   GEN no acute distress  RESP Clear to auscultation, no wheezes, rales or rhonchi. Symmetric chest movement while on room air. CARDIO/VASC S1/S2 auscultated. Regular rate. No peripheral edema. GI Abdomen is soft without significant tenderness, masses, or guarding. Bowel sounds are normoactive. NEURO AAO x2. No gross focal neurological deficits evident at this time.     Medications:   Medications:    carvedilol  6.25 mg Oral BID WC    sodium chloride flush  5-40 mL IntraVENous 2 times per day    atorvastatin  20 mg Oral Nightly    folic acid-pyridoxine-cyancobalamin  1 tablet Oral Daily    donepezil  10 mg Oral BID      Infusions:    sodium chloride 25 mL (12/03/21 0955)     PRN Meds: hydrALAZINE, 10 mg, Q6H PRN  sodium chloride flush, 5-40 mL, PRN  sodium chloride, 25 mL, PRN  ondansetron, 4 mg, Q8H PRN   Or  ondansetron, 4 mg, Q6H PRN  polyethylene glycol, 17 g, Daily PRN  acetaminophen, 650 mg, Q6H PRN   Or  acetaminophen, 650 mg, Q6H PRN  labetalol, 10 mg, Q6H PRN          Electronically signed by Lance Urias MD on 12/5/2021 at 10:50 AM

## 2021-12-05 NOTE — CARE COORDINATION
CM / ER was contacted by Abena Somers RN on [address] [facility] --her call back number is [phone] . Pt is scheduled to be released tonight The only contact listed for Pt is Franklin Edmondson at [phone] as also verified with Shaylee Delacruz CM in her attempt to contact yesterday . Pt is stated to have severe Dementia. Another party whom apparently claims knowledge of the family was expressing concern  for the pt wellbeing was seeking information to gain contact with or for the pt (pt has severe dementia) in relation to her home care or even transportation . CM / ER advised Abena Somers to deny any information to this person and considered it a potential breech of confidentiality to Pt and family . Any claims were unsubstantiated and were not connected to any licensed medical or social entity. [facility] staff should therefore be confined to contact with the daughter listed in the contact section . Additionally, pt has / is being seen and attended to with Raza Mercado . Discharge accordingly.  Raúl Savage / [facility] / [facility]

## 2021-12-06 ENCOUNTER — CARE COORDINATION (OUTPATIENT)
Dept: CASE MANAGEMENT | Age: 86
End: 2021-12-06

## 2021-12-06 DIAGNOSIS — R40.4 TRANSIENT ALTERATION OF AWARENESS: Primary | ICD-10-CM

## 2021-12-07 ENCOUNTER — CARE COORDINATION (OUTPATIENT)
Dept: CASE MANAGEMENT | Age: 86
End: 2021-12-07

## 2021-12-07 NOTE — CARE COORDINATION
[facility] Transitions Follow Up Call    BPCI    2021    Patient: Xochitl Dimas  Patient : 1932   MRN: [id number]  Reason for Admission: AMS (altered mental status) / UTI  Discharge Date: 21 RARS: Readmission Risk Score: 17.7 ( )    Contacted patient's daughter for BPCI-A follow up call. Called and spoke to patient's daughter, Yamilet [place]. She states patient is stable and about the same as yesterday.  has not contacted her at this time. Will call again. Called Case Management at [facility] and had to leave 2nd message to contact patient's daughter concerning getting [facility] started. Numbers and information left on voice mail. Will continue to follow. Chalmers Fabry, LPN    [phone]  Adiel Roldan / [facility] Transitions Subsequent and Final Call    Subsequent and Final Calls  Do you have any ongoing symptoms?: No  Have your medications changed?: No  Do you have any questions related to your medications?: No  Do you currently have any active services?: No  Do you have any needs or concerns that I can assist you with?: No  Identified Barriers: None  Care Transitions Interventions     Other Services: Completed (Comment:  [facility])     Social Work: Completed    Other Interventions: Follow Up  No future appointments.     Chalmers Fabry, LPN

## 2021-12-15 ENCOUNTER — CARE COORDINATION (OUTPATIENT)
Dept: CASE MANAGEMENT | Age: 86
End: 2021-12-15

## 2021-12-15 NOTE — CARE COORDINATION
[facility] Transitions Follow Up Call    [facility]    12/15/2021    Patient: Darrion Martin  Patient : 1932   MRN: [id number]  Reason for Admission: AMS (altered mental status) / UTI  Discharge Date: 21 RARS: Readmission Risk Score: 17.7 ( )    [facility] Care Transitions Follow Up Call:  Spoke with patient's daughter for Follow up [address] [place] Transition Call post hospital discharge. Daughter states patient doing fine. States patient is responding more and is more alert. Patient is alert to self. Denies urinary burning, pain, frequency, fever, odor, incomplete emptying of bladder. Denies visible blood in urine. Voiding adequate amount of urine - color yellow. Encourage to drink adequate fluids. Has all medications and is taking them as directed  Grandson stays with patient most of the time - per daughter. Patient has no needs or concerns for writer at this time. Will continue to follow. Rene Jones LPN    [phone]  [facility] / [facility] Coordinator        Needs to be reviewed by the provider   Additional needs identified to be addressed with provider No  none             Method of communication with provider : none    Care Transition Nurse (CTN) contacted the family by telephone to follow up after admission on . Verified name and  with family as identifiers. Addressed changes since last contact: none  Advance Care Planning:   Does patient have an Advance Directive:  reviewed and current. CTN reviewed discharge instructions, medical action plan and red flags with family and discussed any barriers to care and/or understanding of plan of care after discharge. Discussed appropriate site of care based on symptoms and resources available to patient including: When to call 911. The family agrees to contact the PCP office for questions related to their healthcare.      Patients top risk factors for readmission: lack of knowledge about disease  Interventions to address

## 2021-12-17 ENCOUNTER — HOSPITAL ENCOUNTER (OUTPATIENT)
Age: 86
Setting detail: SPECIMEN
Discharge: HOME | End: 2021-12-17
Payer: COMMERCIAL

## 2021-12-17 LAB
BACTERIA: NEGATIVE /HPF
BILIRUBIN URINE: ABNORMAL MG/DL
BLOOD, URINE: ABNORMAL
CLARITY: CLEAR
COLOR: YELLOW
GLUCOSE, URINE: NEGATIVE MG/DL
ICTOTEST: NEGATIVE
KETONES, URINE: NEGATIVE MG/DL
LEUKOCYTE ESTERASE, URINE: NEGATIVE
MUCUS: ABNORMAL HPF
NITRITE URINE, QUANTITATIVE: NEGATIVE
PH, URINE: 6 (ref 5–8)
PROTEIN UA: 30 MG/DL
RBC URINE: 3 /HPF (ref 0–6)
SPECIFIC GRAVITY UA: 1.02 (ref 1–1.03)
SQUAMOUS EPITHELIAL: <1 /HPF
TRICHOMONAS: ABNORMAL /HPF
UROBILINOGEN, URINE: NORMAL MG/DL (ref 0.2–1)
WBC UA: <1 /HPF (ref 0–5)

## 2021-12-17 PROCEDURE — 81001 URINALYSIS AUTO W/SCOPE: CPT

## 2022-01-05 ENCOUNTER — CARE COORDINATION (OUTPATIENT)
Dept: CASE MANAGEMENT | Age: 87
End: 2022-01-05

## 2022-03-03 ENCOUNTER — CARE COORDINATION (OUTPATIENT)
Dept: CASE MANAGEMENT | Age: 87
End: 2022-03-03

## 2022-03-03 NOTE — CARE COORDINATION
[facility] Transitions Follow Up Call     BP    3/3/2022    Patient: Dennise Rodriguez  Patient : 1932   MRN: [id number]  Reason for Admission: AMS (altered mental status) / UTI  Discharge Date: 21 RARS: Readmission Risk Score: 17.7 ( )    NON Reached BP Care Transition Follow Up Call: Attempted to contact patient for follow up BPCI-A transition call. Left HIPAA Compliant message on Voice mail to call CTN (number given) with any questions and an update on your condition since discharge. Left HIPAA Compliant message that this is the Final Call BP (Patient has been followed for 90 days) and to call PCP/Specialist for any problems or issues that occur. Rickie Hinkle LPN    [phone]  [facility] / [address] [place] [address] Transitions Subsequent and Final Call    Subsequent and Final Calls  Care Transitions Interventions  Other Interventions: Follow Up  No future appointments.     Rickie Hinkle LPN
